# Patient Record
Sex: MALE | Race: WHITE | NOT HISPANIC OR LATINO | Employment: FULL TIME | URBAN - METROPOLITAN AREA
[De-identification: names, ages, dates, MRNs, and addresses within clinical notes are randomized per-mention and may not be internally consistent; named-entity substitution may affect disease eponyms.]

---

## 2017-06-07 ENCOUNTER — AMBULATORY - HEALTHEAST (OUTPATIENT)
Dept: FAMILY MEDICINE | Facility: CLINIC | Age: 52
End: 2017-06-07

## 2017-06-07 DIAGNOSIS — I51.89 FAMILIAL HEART DISEASE: ICD-10-CM

## 2017-06-07 DIAGNOSIS — Z12.5 PROSTATE CANCER SCREENING: ICD-10-CM

## 2017-06-07 DIAGNOSIS — L72.3 SEBACEOUS CYST: ICD-10-CM

## 2017-06-07 ASSESSMENT — MIFFLIN-ST. JEOR: SCORE: 1638.65

## 2017-06-09 ENCOUNTER — OFFICE VISIT - HEALTHEAST (OUTPATIENT)
Dept: SURGERY | Facility: CLINIC | Age: 52
End: 2017-06-09

## 2017-06-09 DIAGNOSIS — L72.3 SEBACEOUS CYST: ICD-10-CM

## 2017-06-16 ENCOUNTER — OFFICE VISIT - HEALTHEAST (OUTPATIENT)
Dept: SURGERY | Facility: CLINIC | Age: 52
End: 2017-06-16

## 2017-06-16 DIAGNOSIS — Z48.89 POSTOPERATIVE VISIT: ICD-10-CM

## 2019-03-13 ENCOUNTER — OFFICE VISIT - HEALTHEAST (OUTPATIENT)
Dept: FAMILY MEDICINE | Facility: CLINIC | Age: 54
End: 2019-03-13

## 2019-03-13 DIAGNOSIS — H69.91 DYSFUNCTION OF RIGHT EUSTACHIAN TUBE: ICD-10-CM

## 2019-03-13 DIAGNOSIS — J01.10 ACUTE NON-RECURRENT FRONTAL SINUSITIS: ICD-10-CM

## 2021-01-26 ENCOUNTER — OFFICE VISIT - HEALTHEAST (OUTPATIENT)
Dept: FAMILY MEDICINE | Facility: CLINIC | Age: 56
End: 2021-01-26

## 2021-01-26 ENCOUNTER — COMMUNICATION - HEALTHEAST (OUTPATIENT)
Dept: TELEHEALTH | Facility: CLINIC | Age: 56
End: 2021-01-26

## 2021-01-26 DIAGNOSIS — Z00.00 WELLNESS EXAMINATION: ICD-10-CM

## 2021-01-26 DIAGNOSIS — R97.20 ELEVATED PROSTATE SPECIFIC ANTIGEN (PSA): ICD-10-CM

## 2021-01-26 DIAGNOSIS — Z23 NEED FOR VACCINATION: ICD-10-CM

## 2021-01-26 LAB
ANION GAP SERPL CALCULATED.3IONS-SCNC: 8 MMOL/L (ref 5–18)
BUN SERPL-MCNC: 16 MG/DL (ref 8–22)
CALCIUM SERPL-MCNC: 9.3 MG/DL (ref 8.5–10.5)
CHLORIDE BLD-SCNC: 107 MMOL/L (ref 98–107)
CHOLEST SERPL-MCNC: 173 MG/DL
CO2 SERPL-SCNC: 27 MMOL/L (ref 22–31)
CREAT SERPL-MCNC: 1.17 MG/DL (ref 0.7–1.3)
ERYTHROCYTE [DISTWIDTH] IN BLOOD BY AUTOMATED COUNT: 12.1 % (ref 11–14.5)
FASTING STATUS PATIENT QL REPORTED: YES
GFR SERPL CREATININE-BSD FRML MDRD: >60 ML/MIN/1.73M2
GLUCOSE BLD-MCNC: 102 MG/DL (ref 70–125)
HCT VFR BLD AUTO: 53.3 % (ref 40–54)
HDLC SERPL-MCNC: 44 MG/DL
HGB BLD-MCNC: 17.9 G/DL (ref 14–18)
LDLC SERPL CALC-MCNC: 116 MG/DL
MCH RBC QN AUTO: 31.2 PG (ref 27–34)
MCHC RBC AUTO-ENTMCNC: 33.6 G/DL (ref 32–36)
MCV RBC AUTO: 93 FL (ref 80–100)
PLATELET # BLD AUTO: 136 THOU/UL (ref 140–440)
PMV BLD AUTO: 7.6 FL (ref 7–10)
POTASSIUM BLD-SCNC: 4.9 MMOL/L (ref 3.5–5)
PSA SERPL-MCNC: 7.9 NG/ML (ref 0–3.5)
RBC # BLD AUTO: 5.75 MILL/UL (ref 4.4–6.2)
SODIUM SERPL-SCNC: 142 MMOL/L (ref 136–145)
TRIGL SERPL-MCNC: 65 MG/DL
WBC: 4.4 THOU/UL (ref 4–11)

## 2021-01-26 ASSESSMENT — MIFFLIN-ST. JEOR: SCORE: 1688.55

## 2021-02-01 ENCOUNTER — COMMUNICATION - HEALTHEAST (OUTPATIENT)
Dept: FAMILY MEDICINE | Facility: CLINIC | Age: 56
End: 2021-02-01

## 2021-04-23 ENCOUNTER — AMBULATORY - HEALTHEAST (OUTPATIENT)
Dept: NURSING | Facility: CLINIC | Age: 56
End: 2021-04-23

## 2021-05-14 ENCOUNTER — AMBULATORY - HEALTHEAST (OUTPATIENT)
Dept: NURSING | Facility: CLINIC | Age: 56
End: 2021-05-14

## 2021-05-28 ASSESSMENT — ASTHMA QUESTIONNAIRES: ACT_TOTALSCORE: 21

## 2021-05-31 VITALS — WEIGHT: 178 LBS | HEIGHT: 70 IN | BODY MASS INDEX: 25.48 KG/M2

## 2021-05-31 VITALS — WEIGHT: 182 LBS | BODY MASS INDEX: 26.11 KG/M2

## 2021-06-02 ENCOUNTER — OFFICE VISIT - HEALTHEAST (OUTPATIENT)
Dept: INTERNAL MEDICINE | Facility: CLINIC | Age: 56
End: 2021-06-02

## 2021-06-02 VITALS — BODY MASS INDEX: 26.26 KG/M2 | WEIGHT: 183 LBS

## 2021-06-02 DIAGNOSIS — R53.82 CHRONIC FATIGUE: ICD-10-CM

## 2021-06-02 DIAGNOSIS — R53.81 MALAISE: ICD-10-CM

## 2021-06-02 DIAGNOSIS — J45.20 MILD INTERMITTENT ASTHMA, UNSPECIFIED WHETHER COMPLICATED: ICD-10-CM

## 2021-06-02 DIAGNOSIS — L57.8 SUN-DAMAGED SKIN: ICD-10-CM

## 2021-06-02 LAB
ALBUMIN SERPL-MCNC: 4.4 G/DL (ref 3.5–5)
ALP SERPL-CCNC: 93 U/L (ref 45–120)
ALT SERPL W P-5'-P-CCNC: 23 U/L (ref 0–45)
ANION GAP SERPL CALCULATED.3IONS-SCNC: 12 MMOL/L (ref 5–18)
AST SERPL W P-5'-P-CCNC: 20 U/L (ref 0–40)
BASOPHILS # BLD AUTO: 0 THOU/UL (ref 0–0.2)
BASOPHILS NFR BLD AUTO: 0 % (ref 0–2)
BILIRUB SERPL-MCNC: 0.7 MG/DL (ref 0–1)
BUN SERPL-MCNC: 22 MG/DL (ref 8–22)
C REACTIVE PROTEIN LHE: 0.1 MG/DL (ref 0–0.8)
CALCIUM SERPL-MCNC: 9.2 MG/DL (ref 8.5–10.5)
CHLORIDE BLD-SCNC: 104 MMOL/L (ref 98–107)
CO2 SERPL-SCNC: 24 MMOL/L (ref 22–31)
CREAT SERPL-MCNC: 1.22 MG/DL (ref 0.7–1.3)
EOSINOPHIL # BLD AUTO: 0.3 THOU/UL (ref 0–0.4)
EOSINOPHIL NFR BLD AUTO: 4 % (ref 0–6)
ERYTHROCYTE [DISTWIDTH] IN BLOOD BY AUTOMATED COUNT: 12.4 % (ref 11–14.5)
ERYTHROCYTE [SEDIMENTATION RATE] IN BLOOD BY WESTERGREN METHOD: 2 MM/HR (ref 0–15)
GFR SERPL CREATININE-BSD FRML MDRD: >60 ML/MIN/1.73M2
GLUCOSE BLD-MCNC: 108 MG/DL (ref 70–125)
HCT VFR BLD AUTO: 47.1 % (ref 40–54)
HGB BLD-MCNC: 16.4 G/DL (ref 14–18)
IMM GRANULOCYTES # BLD: 0 THOU/UL
IMM GRANULOCYTES NFR BLD: 0 %
LYMPHOCYTES # BLD AUTO: 1.2 THOU/UL (ref 0.8–4.4)
LYMPHOCYTES NFR BLD AUTO: 17 % (ref 20–40)
MCH RBC QN AUTO: 30.3 PG (ref 27–34)
MCHC RBC AUTO-ENTMCNC: 34.8 G/DL (ref 32–36)
MCV RBC AUTO: 87 FL (ref 80–100)
MONOCYTES # BLD AUTO: 0.5 THOU/UL (ref 0–0.9)
MONOCYTES NFR BLD AUTO: 7 % (ref 2–10)
NEUTROPHILS # BLD AUTO: 5 THOU/UL (ref 2–7.7)
NEUTROPHILS NFR BLD AUTO: 72 % (ref 50–70)
PLATELET # BLD AUTO: 171 THOU/UL (ref 140–440)
PMV BLD AUTO: 9.6 FL (ref 7–10)
POTASSIUM BLD-SCNC: 4.2 MMOL/L (ref 3.5–5)
PROT SERPL-MCNC: 7.1 G/DL (ref 6–8)
RBC # BLD AUTO: 5.42 MILL/UL (ref 4.4–6.2)
SODIUM SERPL-SCNC: 140 MMOL/L (ref 136–145)
TSH SERPL DL<=0.005 MIU/L-ACNC: 1.51 UIU/ML (ref 0.3–5)
VIT B12 SERPL-MCNC: 461 PG/ML (ref 213–816)
WBC: 7 THOU/UL (ref 4–11)

## 2021-06-02 RX ORDER — FEXOFENADINE HCL AND PSEUDOEPHEDRINE HCL 180; 240 MG/1; MG/1
1 TABLET, EXTENDED RELEASE ORAL DAILY
Status: SHIPPED | COMMUNITY
Start: 2021-06-02 | End: 2024-03-01

## 2021-06-02 RX ORDER — ALBUTEROL SULFATE 90 UG/1
2 AEROSOL, METERED RESPIRATORY (INHALATION) EVERY 6 HOURS PRN
Qty: 1 EACH | Refills: 5 | Status: SHIPPED | OUTPATIENT
Start: 2021-06-02 | End: 2023-03-21

## 2021-06-03 LAB — 25(OH)D3 SERPL-MCNC: 41.8 NG/ML (ref 30–80)

## 2021-06-05 VITALS
HEART RATE: 80 BPM | SYSTOLIC BLOOD PRESSURE: 120 MMHG | OXYGEN SATURATION: 99 % | DIASTOLIC BLOOD PRESSURE: 90 MMHG | BODY MASS INDEX: 27.06 KG/M2 | HEIGHT: 70 IN | WEIGHT: 189 LBS

## 2021-06-11 NOTE — PROGRESS NOTES
HPI: Pt is here for follow up of a seb cyst removal on scalp.   he is doing well.  Pain is well controlled.  No difficulties with the surgical wound/wounds.  he is eating well and denies fever and chills.         /84  Pulse 64  SpO2 96%    EXAM:  GENERAL:Appears well    SURGICAL WOUNDS:  Incisions healing well, no enduration or drainage.    .lastlab[CASEREPORT    Assessment/Plan: . Doing well after surgery and should follow up as needed.  Sonia Paul PA-C  [facility] Department of Surgery

## 2021-06-11 NOTE — PROGRESS NOTES
Excision  Date/Time: 6/16/2017 2:58 PM  Performed by: HITESH KRAUSE  Authorized by: HITESH KRAUSE     Consent:     Consent obtained:  Verbal    Consent given by:  Patient    Risks discussed:  Bleeding, infection, pain and poor cosmetic result    Alternatives discussed:  No treatment  Universal protocol:     Procedure explained and questions answered to patient or proxy's satisfaction: yes      Relevant documents present and verified: yes      Test results available and properly labeled: yes      Site/side marked: yes      Patient identity confirmed:  Verbally with patient  Indications:     Indications:  Cyst on forehead for many years enlarging  Pre-procedure details:     Skin preparation:  Betadine    Preparation: Patient was prepped and draped in the usual sterile fashion    Anesthesia (see MAR for exact dosages):     Anesthesia method:  Local infiltration    Local anesthetic:  Lidocaine 1% WITH epi  Post-procedure details:     Patient tolerance of procedure:  Tolerated well, no immediate complications  Comments:      Indications: Left scalp cyst  Enlarging cyst on his forehead, over the past few years becoming more symptomatic and enlarging    Procedure:  Consent obtained his forehead was prepped and draped in sterile manner.  1% lidocaine with epinephrine was infiltrated into the cyst itself and subcutaneous tissues in the field block of the area.  Incision was made over the cyst the cyst was shelled out in its entirety.  There are 2 cysts one large one about 2-1/2 cm in diameter and the other small and about 0.8 cm in diameter.  These were both removed from the cavity area.  We obtained hemostasis with electrocautery and the base deep layers are closed with 3-0 Vicryl suture in a subcuticular stitch.  The skin was closed with 4-0 Monocryl subcuticular stitch.  Steri-Strip and sterile dressing was applied.  No apparent complications tolerated without any issues.    Hitesh Krause MD  [facility] Surgery  Dept.\

## 2021-06-14 NOTE — PROGRESS NOTES
Assessment:      Healthy male exam.      Plan:    1. Wellness examination  -Encourage exercise, baby aspirin daily, and discuss stress management.  - Tdap vaccine greater than or equal to 6yo IM  - HM2(CBC w/o Differential)  - Basic Metabolic Panel  - Lipid Cascade  - PSA (Prostatic-Specific Antigen), Annual Screen    RTC 1 year     All questions answered.     Subjective:      Chidi Mathew is a 56 y.o. male who presents for an annual exam.  He works for an [employer] and has aches and pains consistent with his job.  He is considering slowing down which I think is a reasonable thing.  Otherwise he plays hockey several times a week, is active and has no complaints.    Healthy Habits:   Regular Exercise: No  Sunscreen Use: No  Healthy Diet: Yes  Dental Visits Regularly: No  Seat Belt: Yes  Sexually active: Yes  Monthly Self Testicular Exams:  Yes  Hemoccults: No  Flex Sig: No  Colonoscopy: Yes  Lipid Profile: Yes  Glucose Screen: Yes  Prevention of Osteoporosis: No  Last Dexa: No  Guns at Home:  No      Immunization History   Administered Date(s) Administered     DT (pediatric) 09/07/2000, 04/16/2003     Td,adult,historic,unspecified 04/16/2003     Immunization status: up to date and documented.    No exam data present     Current Outpatient Medications   Medication Sig Dispense Refill     cetirizine (ZYRTEC) 10 MG tablet Take 1 tablet (10 mg total) by mouth daily. 30 tablet 0     fluticasone (FLONASE ALLERGY RELIEF) 50 mcg/actuation nasal spray 1 spray into each nostril daily. 16 g 0     No current facility-administered medications for this visit.      No past medical history on file.  No past surgical history on file.  Patient has no known allergies.  Family History   Problem Relation Age of Onset     Heart disease Father      Social History     Socioeconomic History     Marital status:      Spouse name: Not on file     Number of children: Not on file     Years of education: Not on file     Highest  education level: Not on file   Occupational History     Not on file   Social Needs     Financial resource strain: Not on file     Food insecurity     Worry: Not on file     Inability: Not on file     Transportation needs     Medical: Not on file     Non-medical: Not on file   Tobacco Use     Smoking status: Never Smoker     Smokeless tobacco: Never Used   Substance and Sexual Activity     Alcohol use: Yes     Comment: rare occasions     Drug use: No     Sexual activity: Yes     Partners: Female   Lifestyle     Physical activity     Days per week: Not on file     Minutes per session: Not on file     Stress: Not on file   Relationships     Social connections     Talks on phone: Not on file     Gets together: Not on file     Attends Latter-day service: Not on file     Active member of club or organization: Not on file     Attends meetings of clubs or organizations: Not on file     Relationship status: Not on file     Intimate partner violence     Fear of current or ex partner: Not on file     Emotionally abused: Not on file     Physically abused: Not on file     Forced sexual activity: Not on file   Other Topics Concern     Not on file   Social History Narrative     Not on file       Review of Systems  Review of Systems          Objective:     There were no vitals filed for this visit.  There is no height or weight on file to calculate BMI.    Physical  Physical Exam       Constitutional:    --Vitals as above  --No acute distress  Eyes-  --Sclera noninjected  --Lids and conjunctiva normal  ENT-  --TMs clear  --Sclera noninjected  --Pharynx not erythematous  Neck-  --Neck supple with no cervical lymphadenopathy  Lungs-  --Clear to Auscultation  Heart-  --Regular rate and rhythm  Abdomen--  --Soft, non-tender, non-distended  Skin-  --Pink and dry  Psych-  --Alert and oriented  --Normal affect      ADDENDUM: PSA came back elevated.  Discussed with pt and emphasized urgency in seeing urology.  Urgent referral to urology  placed.

## 2021-06-16 PROBLEM — L57.8 SUN-DAMAGED SKIN: Status: ACTIVE | Noted: 2021-06-02

## 2021-06-17 NOTE — PATIENT INSTRUCTIONS - HE
Patient Instructions by Albert Louie PA-C at 3/13/2019  7:30 AM     Author: Albert Louie PA-C Service: -- Author Type: Physician Assistant    Filed: 3/13/2019  7:58 AM Encounter Date: 3/13/2019 Status: Signed    : Albert Louie PA-C (Physician Assistant)       Over-the-counter nasal steroid spray, follow packaging directions  Over-the-counter Mucinex, follow packaging directions  Hot packs 3 times per day to the forehead and face over the tender sinuses  Distal saline salt water or saline irrigation with Weston Haley  Antibiotic as written below.  Risks and benefits of the medication were gone over.  Indication for return to see urgent care or family practice provider for reevaluation and treatment.    As a result of our visit today, here are the action plans for you:    1. Medication(s) to stop: There are no discontinued medications.    2. Medication(s) to start or change:   Medications Ordered   Medications   ? cetirizine (ZYRTEC) 10 MG tablet     Sig: Take 1 tablet (10 mg total) by mouth daily.     Dispense:  30 tablet     Refill:  0   ? doxycycline (MONODOX) 100 MG capsule     Sig: Take 1 capsule (100 mg total) by mouth 2 (two) times a day for 10 days.     Dispense:  20 capsule     Refill:  0   ? fluticasone (FLONASE ALLERGY RELIEF) 50 mcg/actuation nasal spray     Si spray into each nostril daily.     Dispense:  16 g     Refill:  0       3. Other instructions: Yes    Use of over-the-counter Zyrtec.  Follow packaging directions  Use of over-the-counter Flonase  Frequent swallowing drinking and chewing gum to help create low-grade suction on the eustachian tube.  Elevate head at nighttime so it does not increase pressure  Use of over-the-counter Tylenol as needed for comfort.  Return to primary care provider for reevaluation treatment if any complication or new symptoms should present.        Patient Education     Earache, No Infection (Adult)  Earaches can happen without an infection. This occurs  when air and fluid build up behind the eardrum causing a feeling of fullness and discomfort and reduced hearing. This is called otitis media with effusion (OME) or serous otitis media. It means there is fluid in the middle ear. It is not the same as acute otitis media, which is typically from infection.  OME can happen when you have a cold if congestion blocks the passage that drains the middle ear. This passage is called the eustachian tube. OME may also occur with nasal allergies or after a bacterial middle ear infection.    The pain or discomfort may come and go. You may hear clicking or popping sounds when you chew or swallow. You may feel that your balance is off. Or you may hear ringing in the ear.  It often takes from several weeks up to 3 months for the fluid to clear on its own. Oral pain relievers and ear drops help if there is pain. Decongestants and antihistamines sometimes help. Antibiotics don't help since there is no infection. Your doctor may prescribe a nasal spray to help reduce swelling in the nose and eustachian tube. This can allow the ear to drain.  If your OME doesn't improve after 3 months, surgery may be used to drain the fluid and insert a small tube in the eardrum to allow continued drainage.  Because the middle ear fluid can become infected, it is important to watch for signs of an ear infection which may develop later. These signs include increased ear pain, fever, or drainage from the ear.  Home care  The following guidelines will help you care for yourself at home:    You may use over-the-counter medicine as directed to control pain, unless another medicine was prescribed. If you have chronic liver or kidney disease or ever had a stomach ulcer or GI bleeding, talk with your doctor before using these medicines. Aspirin should never be used in anyone under 18 years of age who is ill with a fever. It may cause severe liver damage.    You may use over-the-counter decongestants such as  phenylephrine or pseudoephedrine. But they are not always helpful. Don't use nasal spray decongestants more than 3 days. Longer use can make congestion worse. Prescription nasal sprays from your doctor don't typically have those restrictions.    Antihistamines may help if you are also having allergy symptoms.    You may use medicines such as guaifenesin to thin mucus and promote drainage.  Follow-up care  Follow up with your healthcare provider or as advised if you are not feeling better after 3 days.  When to seek medical advice  Call your healthcare provider right away if any of the following occur:    Your ear pain gets worse or does not start to improve     Fever of 100.4 F (38 C) or higher, or as directed by your healthcare provider    Fluid or blood draining from the ear    Headache or sinus pain    Stiff neck    Unusual drowsiness or confusion  Date Last Reviewed: 10/1/2016    2487-7916 [employer]. [address] [place] [address]. All rights reserved. This information is not intended as a substitute for professional medical care. Always follow your healthcare professional's instructions.             Acute Bacterial Rhinosinusitis (ABRS)  Acute bacterial rhinosinusitis (ABRS) is an infection of your nasal cavity and sinuses. Its caused by bacteria. Acute means that youve had symptoms for less than 12 weeks.  Understanding your sinuses  The nasal cavity is the large air-filled space behind your nose. The sinuses are a group of spaces formed by the bones of your face. They connect with your nasal cavity. ABRS causes the tissue lining these spaces to become inflamed. Mucus may not drain normally. This leads to facial pain and other symptoms.  What causes ABRS?  ABRS most often follows an upper respiratory infection caused by a virus. Bacteria then infect the lining of your nasal cavity and sinuses. But you can also get ABRS if you have:    Nasal allergies    Long-term nasal swelling and  congestion not caused by allergies    Blockage in the nose  Symptoms of ABRS  The symptoms of ABRS may be different for each person, and can include:    Nasal congestion    Runny nose    Fluid draining from the nose down the throat (postnasal drip)    Headache    Cough    Pain in the sinuses    Thick, colored fluid from the nose (mucus)    Fever  Diagnosing ABRS  ABRS may be diagnosed if youve had an upper respiratory infection like a cold and cough for longer than 10 to 14 days. Your health care provider will ask about your symptoms and your medical history. The provider will check your vital signs, including your temperature. Youll have a physical exam. The health care provider will check your ears, nose, and throat. You likely wont need any tests. If ABRS comes back, you may have a culture or other tests.  Treatment for ABRS  Treatment may include:    Antibiotic medicine. This is for symptoms that last for at least 10 to 14 days.    Nasal corticosteroid medicine. Drops or spray used in the nose can lessen swelling and congestion.    Over-the-counter pain medicine. This is to lessen sinus pain and pressure.    Nasal decongestant medicine. Spray or drops may help to lessen congestion. Do not use them for more than a few days.    Salt wash (saline irrigation). This can help to loosen mucus.  Possible complications of ABRS  ABRS may come back or become long-term (chronic).  In rare cases, ABRS may cause complications such as:     Inflamed tissue around the brain and spinal cord (meningitis)    Inflamed tissue around the eyes (orbital cellulitis)    Inflamed bones around the sinuses (osteitis)  These problems may need to be treated in a hospital with intravenous (IV) antibiotic medicine or surgery.  When to call the health care provider  Call your health care provider if you have any of the following:    Symptoms that dont get better, or get worse    Symptoms that dont get better after 3 to 5 days on  antibiotics    Trouble seeing    Swelling around your eyes    Confusion or trouble staying awake   Date Last Reviewed: 3/3/2015    7617-6391 [employer]. [address] [place] [address], [place] [address]. All rights reserved. This information is not intended as a substitute for professional medical care. Always follow your healthcare professional's instructions.

## 2021-06-18 NOTE — PROGRESS NOTES
"Chidi Mathew is a 52-year-old resents today for follow-up of his left scalp mass.  Please see previous notes.  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  We reviewed our previous evaluation in 2014 and clearly he did not follow through with excision at that time.  Thankfully he has had no problems leaking or drainage.    We reviewed heart disease prevention and cancer detection.  I encouraged him to follow through with fasting labs as well as PSA test.  His father has had heart problems since his last visit.    Objective:/62  Pulse 77  Resp 16  Ht 5' 10\" (1.778 m)  Wt 178 lb (80.7 kg)  SpO2 96%  BMI 25.54 kg/m2  Skin on the left parietal area he has a about a 3 cm cutaneous fullness that feels like a sebaceous cyst this is noninflamed nontender non-pointing non-erythematous it feels like it is somewhat scarred down around the margins particularly posteriorly and thus I think it has the potential to be challenging here in the clinic.    Assessment: Left scalp sebaceous cyst, family history of heart disease    Plan: Recommend he follow through with fasting labs(he is not fasting here today) follow through with general surgery evaluation and removal of the sebaceous cyst risks benefits side effects reviewed and he will follow-up here otherwise as needed  " 121.6

## 2021-06-25 NOTE — PROGRESS NOTES
Office Visit - New Patient   Chidi PRETTY    56 y.o.  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  The issue that we are investigating today is    Malaise, fatigue, and aches and pains in various parts of his body, occurring over the last approximately 1 year (since early 2020).    He is physically very active.  His job doing autobody requires him to be on the move all day long.  He also plays hockey.  He has noticed that his balance and equilibrium seem a little bit off when he is in the heat of a hockey match.    He told me that at his job he is not exposed to excessive fumes or dust.  He told her that alcohol consumption is modest, maybe a beer a week.  Never had smoking habit.    His past medical history is pretty unremarkable (elevated PSA discussed below), so I am going to cast a pretty broad net here, looking for metabolic, or inflammatory phenomena.  I am going to check a comprehensive metabolic panel, blood cell count with differential, sedimentation rate, C-reactive protein, vitamin D and B12 levels, and thyroid cascade.    If all those come back normal, I would be able to assure him that his core organ systems are performing okay.  I do not find anything focal on physical exam.    I questioned him about obstructive sleep apnea, and he is pretty sure he does not have that.  His wife would have told him.  He told me that he might snore if he sleeps on his back.    Seasonal nasal allergies, for which he uses over-the-counter antihistamine and decongestant.  He has been using Allegra-D, and I told he could consider using the nasal steroid fluticasone (known as the brand name Flonase), 2 sprays per nostril per day during allergy season.    Mild asthma,  worse when he was a kid, I will issue him an albuterol inhaler to have on hand just in case    Small right-sided inguinal hernia, not bothersome    Imbalance, possibly mild vertigo symptoms, I wonder if this has to do with eustachian tube dysfunction from his allergies, which could affect middle and inner ear function.    Elevated PSA from January 2021, thoroughly evaluated at [facility], attributed to BPH with neurologist telling him he has an enlarged prostate gland.  On a general checkup in January 2021, an unexpected finding of a PSA of 7.9 was disclosed.  He then underwent a prostate ultrasound and MRI followed ultimately by biopsy, with no cancerous tissue found.  He will be following up with [facility].  He told me that he might experience some mild BPH symptoms of reduced flow, but otherwise bladder empties out pretty well.    Sun damaged skin and a more darkly pigmented mole on his left lower back.  I would recommend that he see dermatology.  He will self refer.    He received second dose of Pfizer COVID-19 vaccine May 14, 2021.    ---------------------------------------------------------------------------------------------------------------------------  Chief Complaint   Chief Complaint   Patient presents with     Dizziness     feels off sometimes, SOB comes and goes.        ---------------------------------------------------------------------------------------------------------------------------  History of Present Illness  This 56 y.o. 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  The issue that we are investigating today is    Malaise, fatigue, and aches and pains in various parts of his body, occurring over the last approximately 1 year (since early 2020).    He is physically very active.  His job doing autobody requires  him to be on the move all day long.  He also plays hockey.  He has noticed that his balance and equilibrium seem a little bit off when he is in the heat of a hockey match.    He told me that at his job he is not exposed to excessive fumes or dust.  He told her that alcohol consumption is modest, maybe a beer a week.  Never had smoking habit.    His past medical history is pretty unremarkable (elevated PSA discussed below), so I am going to cast a pretty broad net here, looking for metabolic, or inflammatory phenomena.  I am going to check a comprehensive metabolic panel, blood cell count with differential, sedimentation rate, C-reactive protein, vitamin D and B12 levels, and thyroid cascade.    If all those come back normal, I would be able to assure him that his core organ systems are performing okay.  I do not find anything focal on physical exam.    I questioned him about obstructive sleep apnea, and he is pretty sure he does not have that.  His wife would have told him.  He told me that he might snore if he sleeps on his back.      BPH  Lab Results   Component Value Date    PSA 7.9 (H) 01/26/2021    PSA 1.0 08/26/2014       Wt Readings from Last 3 Encounters:   06/02/21 186 lb (84.4 kg)   01/26/21 189 lb (85.7 kg)   03/13/19 183 lb (83 kg)     BP Readings from Last 3 Encounters:   06/02/21 112/80   01/26/21 120/90   03/13/19 146/82       Lab Results   Component Value Date    WBC 4.4 01/26/2021    HGB 17.9 01/26/2021    HCT 53.3 01/26/2021     (L) 01/26/2021    CHOL 173 01/26/2021    TRIG 65 01/26/2021    HDL 44 01/26/2021    LDLDIRECT 133 (H) 08/26/2014     01/26/2021    K 4.9 01/26/2021     01/26/2021    CREATININE 1.17 01/26/2021    BUN 16 01/26/2021    CO2 27 01/26/2021    PSA 7.9 (H) 01/26/2021       Immunization History   Administered Date(s) Administered     COVID-19,PF,Pfizer 04/23/2021, 05/14/2021     DT (pediatric) 09/07/2000, 04/16/2003     Td,adult,historic,unspecified 04/16/2003      Tdap 01/26/2021       Review of Systems: A comprehensive review of systems was negative except as noted.  ---------------------------------------------------------------------------------------------------------------------------    Medications and Allergies   Current Outpatient Medications   Medication Sig Dispense Refill     fexofenadine-pseudoephedrine (ALLEGRA-D 24) 180-240 mg per 24 hr tablet Take 1 tablet by mouth daily.       No current facility-administered medications for this visit.      Allergies   Allergen Reactions     Pollen Extracts         Active Ambulatory Problems     Diagnosis Date Noted     Allergic Rhinitis      Asthma      Sebaceous Cyst      Inguinal Hernia      Resolved Ambulatory Problems     Diagnosis Date Noted     No Resolved Ambulatory Problems     No Additional Past Medical History     No past surgical history on file.   No past medical history on file.     Family and Social History   Family History   Problem Relation Age of Onset     Heart disease Father         Social History     Tobacco Use     Smoking status: Never Smoker     Smokeless tobacco: Never Used   Substance Use Topics     Alcohol use: Yes     Comment: rare occasions     Drug use: No        Physical Exam   General Appearance:   He appears well, athletic build, excellent blood pressure, reasonable BMI given his muscle mass    /80   Pulse 87   Wt 186 lb (84.4 kg)   SpO2 97%   BMI 26.69 kg/m      General: Alert, in no distress  Skin: + Sun damaged skin and some moles, with a darker 2 mm 1 left lower back  Eyes/nose/throat: Eyes without scleral icterus, eye movements normal, pupils equal and reactive, oropharynx clear, ears with normal TM's  MSK: Neck with good ROM  Lymphatic: Neck without adenopathy or masses  Endocrine: Thyroid with no nodules to palpation  Pulm: Lungs clear to auscultation bilaterally  Cardiac: Heart with regular rate and rhythm, no murmur or gallop  GI: Abdomen soft, nontender. No palpable  enlargement of liver or spleen  MSK: Extremities no tenderness or edema  Neuro: Moves all extremities, without focal weakness  Psych: Alert, normal mental status. Normal affect and speech         Additional Information        Vic Bazan MD  Internal Medicine

## 2021-06-26 NOTE — PATIENT INSTRUCTIONS - HE
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  The issue that we are investigating today is    Malaise, fatigue, and aches and pains in various parts of his body, occurring over the last approximately 1 year (since early 2020).    He is physically very active.  His job doing autobody requires him to be on the move all day long.  He also plays hockey.  He has noticed that his balance and equilibrium seem a little bit off when he is in the heat of a hockey match.    He told me that at his job he is not exposed to excessive fumes or dust.  He told her that alcohol consumption is modest, maybe a beer a week.  Never had smoking habit.    His past medical history is pretty unremarkable (elevated PSA discussed below), so I am going to cast a pretty broad net here, looking for metabolic, or inflammatory phenomena.  I am going to check a comprehensive metabolic panel, blood cell count with differential, sedimentation rate, C-reactive protein, vitamin D and B12 levels, and thyroid cascade.    If all those come back normal, I would be able to assure him that his core organ systems are performing okay.  I do not find anything focal on physical exam.    I questioned him about obstructive sleep apnea, and he is pretty sure he does not have that.  His wife would have told him.  He told me that he might snore if he sleeps on his back.    Seasonal nasal allergies, for which he uses over-the-counter antihistamine and decongestant.  He has been using Allegra-D, and I told he could consider using the nasal steroid fluticasone (known as the brand name Flonase), 2 sprays per nostril per day during allergy season.    Mild asthma, worse when he was a kid, I will issue him an albuterol inhaler to have on hand just in case    Small right-sided inguinal hernia, not  bothersome    Imbalance, possibly mild vertigo symptoms, I wonder if this has to do with eustachian tube dysfunction from his allergies, which could affect middle and inner ear function.    Elevated PSA from January 2021, thoroughly evaluated at [facility], attributed to BPH with neurologist telling him he has an enlarged prostate gland.  On a general checkup in January 2021, an unexpected finding of a PSA of 7.9 was disclosed.  He then underwent a prostate ultrasound and MRI followed ultimately by biopsy, with no cancerous tissue found.  He will be following up with [facility].  He told me that he might experience some mild BPH symptoms of reduced flow, but otherwise bladder empties out pretty well.    Sun damaged skin and a more darkly pigmented mole on his left lower back.  I would recommend that he see dermatology.  He will self refer.    He received second dose of Pfizer COVID-19 vaccine May 14, 2021.

## 2021-06-27 NOTE — PROGRESS NOTES
Progress Notes by Albert Louie PA-C at 3/13/2019  7:30 AM     Author: Albert Louie PA-C Service: -- Author Type: Physician Assistant    Filed: 3/13/2019  3:15 PM Encounter Date: 3/13/2019 Status: Signed    : Albert Louie PA-C (Physician Assistant)       Subjective:      Patient ID: Chidi Mathew is a 54 y.o. male.    Chief Complaint:    HPI  Chidi Mathew is a 54 y.o. male who presents today complaining of a two week cold like symptoms and a two day history of acute onset facial frontal and maxillary pain and pressure that is radiating to the teeth and to the jaw.  Patient has a headache that is made worse with dependency.  Postnasal drainage.  Denies fever chills night sweats epistaxis or other constitutional symptoms.  Has not tried any treatment for this at home.    No past medical history on file.    No past surgical history on file.    Family History   Problem Relation Age of Onset   ? Heart disease Father        Social History     Tobacco Use   ? Smoking status: Never Smoker   ? Smokeless tobacco: Never Used   Substance Use Topics   ? Alcohol use: Yes     Comment: occasional   ? Drug use: No       Review of Systems  As above in HPI, otherwise balance of Review of Systems are negative.    Objective:     /82   Pulse 69   Temp 98.6  F (37  C) (Oral)   Resp 10   Wt 183 lb (83 kg)   SpO2 96%   BMI 26.26 kg/m      Physical Exam  General: Patient is resting comfortably no acute distress is afebrile  HEENT: Head is normocephalic atraumatic   Frontal and maxillary sinuses are tender to percussion  eyes are PERRL   EOMI sclera anicteric   TMs on the right is congested with dull cone of light reflex  TM on the left is clear.  Throat is with mild posterior pharyngeal wall exudate but no erythema  No cervical lymphadenopathy present  Lungs: Clear to auscultation bilaterally  Heart: Regular rate and rhythm  Skin: Without rash non-diaphoretic      Assessment:     Procedures    The primary encounter  diagnosis was Acute non-recurrent frontal sinusitis. A diagnosis of Dysfunction of right eustachian tube was also pertinent to this visit.    Plan:     1. Acute non-recurrent frontal sinusitis  doxycycline (MONODOX) 100 MG capsule   2. Dysfunction of right eustachian tube  fluticasone (FLONASE ALLERGY RELIEF) 50 mcg/actuation nasal spray    cetirizine (ZYRTEC) 10 MG tablet         Patient Instructions     Over-the-counter nasal steroid spray, follow packaging directions  Over-the-counter Mucinex, follow packaging directions  Hot packs 3 times per day to the forehead and face over the tender sinuses  Distal saline salt water or saline irrigation with Di Haley  Antibiotic as written below.  Risks and benefits of the medication were gone over.  Indication for return to see urgent care or family practice provider for reevaluation and treatment.    As a result of our visit today, here are the action plans for you:    1. Medication(s) to stop: There are no discontinued medications.    2. Medication(s) to start or change:   Medications Ordered   Medications   ? cetirizine (ZYRTEC) 10 MG tablet     Sig: Take 1 tablet (10 mg total) by mouth daily.     Dispense:  30 tablet     Refill:  0   ? doxycycline (MONODOX) 100 MG capsule     Sig: Take 1 capsule (100 mg total) by mouth 2 (two) times a day for 10 days.     Dispense:  20 capsule     Refill:  0   ? fluticasone (FLONASE ALLERGY RELIEF) 50 mcg/actuation nasal spray     Si spray into each nostril daily.     Dispense:  16 g     Refill:  0       3. Other instructions: Yes    Use of over-the-counter Zyrtec.  Follow packaging directions  Use of over-the-counter Flonase  Frequent swallowing drinking and chewing gum to help create low-grade suction on the eustachian tube.  Elevate head at nighttime so it does not increase pressure  Use of over-the-counter Tylenol as needed for comfort.  Return to primary care provider for reevaluation treatment if any complication or new  symptoms should present.        Patient Education     Earache, No Infection (Adult)  Earaches can happen without an infection. This occurs when air and fluid build up behind the eardrum causing a feeling of fullness and discomfort and reduced hearing. This is called otitis media with effusion (OME) or serous otitis media. It means there is fluid in the middle ear. It is not the same as acute otitis media, which is typically from infection.  OME can happen when you have a cold if congestion blocks the passage that drains the middle ear. This passage is called the eustachian tube. OME may also occur with nasal allergies or after a bacterial middle ear infection.    The pain or discomfort may come and go. You may hear clicking or popping sounds when you chew or swallow. You may feel that your balance is off. Or you may hear ringing in the ear.  It often takes from several weeks up to 3 months for the fluid to clear on its own. Oral pain relievers and ear drops help if there is pain. Decongestants and antihistamines sometimes help. Antibiotics don't help since there is no infection. Your doctor may prescribe a nasal spray to help reduce swelling in the nose and eustachian tube. This can allow the ear to drain.  If your OME doesn't improve after 3 months, surgery may be used to drain the fluid and insert a small tube in the eardrum to allow continued drainage.  Because the middle ear fluid can become infected, it is important to watch for signs of an ear infection which may develop later. These signs include increased ear pain, fever, or drainage from the ear.  Home care  The following guidelines will help you care for yourself at home:    You may use over-the-counter medicine as directed to control pain, unless another medicine was prescribed. If you have chronic liver or kidney disease or ever had a stomach ulcer or GI bleeding, talk with your doctor before using these medicines. Aspirin should never be used in anyone  under 18 years of age who is ill with a fever. It may cause severe liver damage.    You may use over-the-counter decongestants such as phenylephrine or pseudoephedrine. But they are not always helpful. Don't use nasal spray decongestants more than 3 days. Longer use can make congestion worse. Prescription nasal sprays from your doctor don't typically have those restrictions.    Antihistamines may help if you are also having allergy symptoms.    You may use medicines such as guaifenesin to thin mucus and promote drainage.  Follow-up care  Follow up with your healthcare provider or as advised if you are not feeling better after 3 days.  When to seek medical advice  Call your healthcare provider right away if any of the following occur:    Your ear pain gets worse or does not start to improve     Fever of 100.4 F (38 C) or higher, or as directed by your healthcare provider    Fluid or blood draining from the ear    Headache or sinus pain    Stiff neck    Unusual drowsiness or confusion  Date Last Reviewed: 10/1/2016    1826-2195 The "[employer]. [address] [place] [address]. All rights reserved. This information is not intended as a substitute for professional medical care. Always follow your healthcare professional's instructions.             Acute Bacterial Rhinosinusitis (ABRS)  Acute bacterial rhinosinusitis (ABRS) is an infection of your nasal cavity and sinuses. Its caused by bacteria. Acute means that youve had symptoms for less than 12 weeks.  Understanding your sinuses  The nasal cavity is the large air-filled space behind your nose. The sinuses are a group of spaces formed by the bones of your face. They connect with your nasal cavity. ABRS causes the tissue lining these spaces to become inflamed. Mucus may not drain normally. This leads to facial pain and other symptoms.  What causes ABRS?  ABRS most often follows an upper respiratory infection caused by a virus. Bacteria then infect  the lining of your nasal cavity and sinuses. But you can also get ABRS if you have:    Nasal allergies    Long-term nasal swelling and congestion not caused by allergies    Blockage in the nose  Symptoms of ABRS  The symptoms of ABRS may be different for each person, and can include:    Nasal congestion    Runny nose    Fluid draining from the nose down the throat (postnasal drip)    Headache    Cough    Pain in the sinuses    Thick, colored fluid from the nose (mucus)    Fever  Diagnosing ABRS  ABRS may be diagnosed if youve had an upper respiratory infection like a cold and cough for longer than 10 to 14 days. Your health care provider will ask about your symptoms and your medical history. The provider will check your vital signs, including your temperature. Youll have a physical exam. The health care provider will check your ears, nose, and throat. You likely wont need any tests. If ABRS comes back, you may have a culture or other tests.  Treatment for ABRS  Treatment may include:    Antibiotic medicine. This is for symptoms that last for at least 10 to 14 days.    Nasal corticosteroid medicine. Drops or spray used in the nose can lessen swelling and congestion.    Over-the-counter pain medicine. This is to lessen sinus pain and pressure.    Nasal decongestant medicine. Spray or drops may help to lessen congestion. Do not use them for more than a few days.    Salt wash (saline irrigation). This can help to loosen mucus.  Possible complications of ABRS  ABRS may come back or become long-term (chronic).  In rare cases, ABRS may cause complications such as:     Inflamed tissue around the brain and spinal cord (meningitis)    Inflamed tissue around the eyes (orbital cellulitis)    Inflamed bones around the sinuses (osteitis)  These problems may need to be treated in a hospital with intravenous (IV) antibiotic medicine or surgery.  When to call the health care provider  Call your health care provider if you have any  of the following:    Symptoms that dont get better, or get worse    Symptoms that dont get better after 3 to 5 days on antibiotics    Trouble seeing    Swelling around your eyes    Confusion or trouble staying awake   Date Last Reviewed: 3/3/2015    9821-1249 [employer]. [address] [place] [address], [place] [address]. All rights reserved. This information is not intended as a substitute for professional medical care. Always follow your healthcare professional's instructions.

## 2021-07-06 VITALS
HEART RATE: 87 BPM | SYSTOLIC BLOOD PRESSURE: 112 MMHG | OXYGEN SATURATION: 97 % | WEIGHT: 186 LBS | DIASTOLIC BLOOD PRESSURE: 80 MMHG | BODY MASS INDEX: 26.69 KG/M2

## 2021-09-19 ENCOUNTER — HEALTH MAINTENANCE LETTER (OUTPATIENT)
Age: 56
End: 2021-09-19

## 2022-03-06 ENCOUNTER — HEALTH MAINTENANCE LETTER (OUTPATIENT)
Age: 57
End: 2022-03-06

## 2022-07-27 ENCOUNTER — TRANSFERRED RECORDS (OUTPATIENT)
Dept: HEALTH INFORMATION MANAGEMENT | Facility: CLINIC | Age: 57
End: 2022-07-27

## 2022-11-18 ENCOUNTER — HOSPITAL ENCOUNTER (OUTPATIENT)
Dept: CT IMAGING | Facility: CLINIC | Age: 57
Discharge: HOME | End: 2022-11-18
Attending: PHYSICIAN ASSISTANT | Admitting: PHYSICIAN ASSISTANT
Payer: COMMERCIAL

## 2022-11-18 ENCOUNTER — OFFICE VISIT (OUTPATIENT)
Dept: FAMILY MEDICINE | Facility: CLINIC | Age: 57
End: 2022-11-18
Payer: COMMERCIAL

## 2022-11-18 VITALS
RESPIRATION RATE: 17 BRPM | HEART RATE: 71 BPM | OXYGEN SATURATION: 97 % | TEMPERATURE: 97.8 F | SYSTOLIC BLOOD PRESSURE: 154 MMHG | BODY MASS INDEX: 27.26 KG/M2 | DIASTOLIC BLOOD PRESSURE: 91 MMHG | WEIGHT: 190 LBS

## 2022-11-18 DIAGNOSIS — N20.0 KIDNEY STONE: ICD-10-CM

## 2022-11-18 DIAGNOSIS — R39.89 URINARY PROBLEM: Primary | ICD-10-CM

## 2022-11-18 LAB
ALBUMIN UR-MCNC: NEGATIVE MG/DL
APPEARANCE UR: CLEAR
BACTERIA #/AREA URNS HPF: ABNORMAL /HPF
BILIRUB UR QL STRIP: NEGATIVE
COLOR UR AUTO: YELLOW
GLUCOSE UR STRIP-MCNC: NEGATIVE MG/DL
HGB UR QL STRIP: ABNORMAL
KETONES UR STRIP-MCNC: ABNORMAL MG/DL
LEUKOCYTE ESTERASE UR QL STRIP: ABNORMAL
NITRATE UR QL: NEGATIVE
PH UR STRIP: 6 [PH] (ref 5–8)
RBC #/AREA URNS AUTO: ABNORMAL /HPF
SP GR UR STRIP: 1.02 (ref 1–1.03)
SQUAMOUS #/AREA URNS AUTO: ABNORMAL /LPF
UROBILINOGEN UR STRIP-ACNC: 1 E.U./DL
WBC #/AREA URNS AUTO: ABNORMAL /HPF

## 2022-11-18 PROCEDURE — 74176 CT ABD & PELVIS W/O CONTRAST: CPT

## 2022-11-18 PROCEDURE — 99214 OFFICE O/P EST MOD 30 MIN: CPT | Performed by: PHYSICIAN ASSISTANT

## 2022-11-18 PROCEDURE — 81001 URINALYSIS AUTO W/SCOPE: CPT | Performed by: PHYSICIAN ASSISTANT

## 2022-11-18 RX ORDER — ASPIRIN 81 MG/1
81 TABLET, CHEWABLE ORAL DAILY
COMMUNITY

## 2022-11-18 RX ORDER — TAMSULOSIN HYDROCHLORIDE 0.4 MG/1
0.4 CAPSULE ORAL DAILY
Qty: 3 CAPSULE | Refills: 0 | Status: SHIPPED | OUTPATIENT
Start: 2022-11-18 | End: 2022-11-21

## 2022-11-18 NOTE — LETTER
[facility]  [address] [address]  Phone: [phone]  Fax: [phone]    November 18, 2022        Chidi Mathew  [address] [place] [address]          To whom it may concern:    RE: Chidi Odenr    Patient is seen today for a kidney stone.  He is excuse from work from 11/18/2022 through 11/21/2022.  He may return sooner as tolerated.    Please contact me for questions or concerns.      Sincerely,        Pham Mena PA-C

## 2022-11-18 NOTE — PROGRESS NOTES
Assessment & Plan:      Problem List Items Addressed This Visit    None  Visit Diagnoses     Urinary problem    -  Primary    Relevant Medications    tamsulosin (FLOMAX) 0.4 MG capsule    Other Relevant Orders    UA macro with reflex to Microscopic and Culture - Clinc Collect (Completed)    Urine Microscopic (Completed)    CT Abdomen Pelvis w/o Contrast (Completed)    Kidney stone            Medical Decision Making  Patient presents with sudden right flank pains over the last 24 hours.  Abdominal CT is positive for 3 mm stone in the distal ureter.  Recommend patient continue to drink plenty of clear fluids.  Also sent Flomax.  Use over-the-counter analgesics for pain relief.  Provided patient with a filter to catch the stone.  Recommend to follow-up with PCP in 3 days for symptom recheck.  Allergies and medication interactions reviewed.  Discussed signs of worsening symptoms and when to follow-up with PCP if no symptom improvement.     Subjective:      Chidi Mathew is a 57 year old male here for evaluation of right flank pains.  Onset of symptoms was yesterday.  Pains began suddenly in the morning and will come and go.  Pains will range from 8 out of 10 in severity at its worst.  Associated symptoms include nausea and subjective fevers.  Patient denies emesis and significant abdominal pains.  No history of kidney stones or UTI.  Patient does have history of prostatitis with occasional microscopic hematuria.  No history of abdominal procedures.     The following portions of the patient's history were reviewed and updated as appropriate: allergies, current medications, and problem list.     Review of Systems  Pertinent items are noted in HPI.    Allergies  Allergies   Allergen Reactions     Pollen Extracts [Pollen Extract] Unknown       Family History   Problem Relation Age of Onset     Heart Disease Father        Social History     Tobacco Use     Smoking status: Never     Smokeless tobacco: Never   Substance  Use Topics     Alcohol use: Yes     Comment: Alcoholic Drinks/day: rare occasions        Objective:      BP (!) 154/91   Pulse 71   Temp 97.8  F (36.6  C)   Resp 17   Wt 86.2 kg (190 lb)   SpO2 97%   BMI 27.26 kg/m    General appearance - alert, well appearing, and in no distress and non-toxic  Chest - clear to auscultation, no wheezes, rales or rhonchi, symmetric air entry  Heart - normal rate, regular rhythm, normal S1, S2, no murmurs, rubs, clicks or gallops  Abdomen - soft, nontender, nondistended, no masses or organomegaly  Back exam - Right-sided CVA tenderness     Lab & Imaging Results    Results for orders placed or performed in visit on 11/18/22   CT Abdomen Pelvis w/o Contrast     Status: None    Narrative    EXAM: CT ABDOMEN PELVIS W/O CONTRAST  LOCATION: [facility]  DATE/TIME: 11/18/2022 4:15 PM    INDICATION: Right flank pain with hematuria  COMPARISON: None.  TECHNIQUE: CT scan of the abdomen and pelvis was performed without oral or IV contrast. Multiplanar reformats were obtained. Dose reduction techniques were used.  CONTRAST: None.    FINDINGS:   LOWER CHEST: A few benign calcified granulomas in the left lower lobe. Visualized lungs are otherwise clear. No pleural effusion. Heart size normal with no pericardial effusion.     HEPATOBILIARY: Liver is normal.  No calcified gallstones or bile duct dilatation.     PANCREAS: Normal.    SPLEEN: Spleen size normal.    ADRENAL GLANDS: Normal.    RIGHT KIDNEY AND URETER: A 3 mm stone in the distal right ureter just proximal to the ureterovesical junction causing proximal obstruction. Nonobstructing right calyceal stones measuring 4 mm and 2 mm.    LEFT KIDNEY AND URETER: Normal.    BLADDER: Normal.    BOWEL: Normal appendix. Bowel is normal with no obstruction or inflammatory change.    LYMPH NODES: No lymphadenopathy.    VASCULATURE: Normal caliber abdominal aorta.      PELVIC ORGANS: Mild prostate enlargement. Small  fat-containing right inguinal hernia.    MUSCULOSKELETAL: Degenerative facet arthritis throughout the lumbar spine.      Impression    IMPRESSION:   1.  A 3 mm stone in the distal right ureter just proximal to the ureterovesical junction causing proximal obstruction.   2.  Nonobstructing right calyceal stones measuring 4 mm and 2 mm.  3.  Mild prostate enlargement.   4.  Small fat-containing right inguinal hernia.   UA macro with reflex to Microscopic and Culture - Clinc Collect     Status: Abnormal    Specimen: Urine, Clean Catch   Result Value Ref Range    Color Urine Yellow Colorless, Straw, Light Yellow, Yellow    Appearance Urine Clear Clear    Glucose Urine Negative Negative mg/dL    Bilirubin Urine Negative Negative    Ketones Urine Trace (A) Negative mg/dL    Specific Gravity Urine 1.025 1.005 - 1.030    Blood Urine Large (A) Negative    pH Urine 6.0 5.0 - 8.0    Protein Albumin Urine Negative Negative mg/dL    Urobilinogen Urine 1.0 0.2, 1.0 E.U./dL    Nitrite Urine Negative Negative    Leukocyte Esterase Urine Trace (A) Negative   Urine Microscopic     Status: Abnormal   Result Value Ref Range    Bacteria Urine Few (A) None Seen /HPF    RBC Urine 10-25 (A) 0-2 /HPF /HPF    WBC Urine 0-5 0-5 /HPF /HPF    Squamous Epithelials Urine None Seen None Seen /LPF    Narrative    Urine Culture not indicated       I personally reviewed these results and discussed findings with the patient.    The use of Dragon/[employer] dictation services was used to construct the content of this note; any grammatical errors are non-intentional. Please contact the author directly if you are in need of any clarification.

## 2022-11-21 ENCOUNTER — HEALTH MAINTENANCE LETTER (OUTPATIENT)
Age: 57
End: 2022-11-21

## 2022-11-24 PROCEDURE — 82365 CALCULUS SPECTROSCOPY: CPT

## 2022-12-01 ENCOUNTER — TELEPHONE (OUTPATIENT)
Dept: UROLOGY | Facility: CLINIC | Age: 57
End: 2022-12-01

## 2022-12-01 ENCOUNTER — APPOINTMENT (OUTPATIENT)
Dept: LAB | Facility: CLINIC | Age: 57
End: 2022-12-01
Payer: COMMERCIAL

## 2022-12-01 ENCOUNTER — TELEPHONE (OUTPATIENT)
Dept: NURSING | Facility: CLINIC | Age: 57
End: 2022-12-01

## 2022-12-01 DIAGNOSIS — N20.1 CALCULUS OF URETER: Primary | ICD-10-CM

## 2022-12-01 DIAGNOSIS — N20.1 CALCULUS OF URETER: ICD-10-CM

## 2022-12-01 NOTE — TELEPHONE ENCOUNTER
Telephone Call:     Pt calling to find out a location to drop of a kidney stone. Pt was seen at [facility] on 11/18/2022 and was given the supplies to drop of the kidney stone after he passed it.     [facility] is the closest location to patient, so he is going to drip it off there.     Smita Ferris RN  [facility] Nurse Advisor 12:42 PM 12/1/2022

## 2022-12-05 LAB
APPEARANCE STONE: NORMAL
COMPN STONE: NORMAL
SPECIMEN WT: 18 MG

## 2022-12-07 ENCOUNTER — HOSPITAL ENCOUNTER (OUTPATIENT)
Facility: CLINIC | Age: 57
End: 2022-12-07
Payer: COMMERCIAL

## 2022-12-08 ENCOUNTER — TELEPHONE (OUTPATIENT)
Dept: UROLOGY | Facility: CLINIC | Age: 57
End: 2022-12-08

## 2022-12-08 NOTE — TELEPHONE ENCOUNTER
Message left for patient to call back to set up a virtual visit to go over stone analysis.  Ioana Vaughn RN

## 2023-03-15 DIAGNOSIS — J45.20 MILD INTERMITTENT ASTHMA, UNSPECIFIED WHETHER COMPLICATED: ICD-10-CM

## 2023-03-15 NOTE — TELEPHONE ENCOUNTER
"Routing refill request to provider for review/approval because:  Failed - Asthma control assessment score within normal limits in last 6 months          Last Written Prescription Date:  6/21/2021  Last Fill Quantity: 1,  # refills: 5   Last office visit provider:  11/18/2022     Requested Prescriptions   Pending Prescriptions Disp Refills     albuterol (PROAIR HFA/PROVENTIL HFA/VENTOLIN HFA) 108 (90 Base) MCG/ACT inhaler [Pharmacy Med Name: ALBUTEROL HFA INH (200 PUFFS) 6.7GM] 13.4 g      Sig: INHALE 2 PUFFS BY MOUTH EVERY 6 HOURS AS NEEDED FOR WHEEZING       Asthma Maintenance Inhalers - Anticholinergics Failed - 3/15/2023  7:16 AM        Failed - Asthma control assessment score within normal limits in last 6 months     Please review ACT score.           Failed - Recent (6 mo) or future (30 days) visit within the authorizing provider's specialty     Patient had office visit in the last 6 months or has a visit in the next 30 days with authorizing provider or within the authorizing provider's specialty.  See \"Patient Info\" tab in inbasket, or \"Choose Columns\" in Meds & Orders section of the refill encounter.            Passed - Patient is age 12 years or older        Passed - Medication is active on med list       Short-Acting Beta Agonist Inhalers Protocol  Failed - 3/15/2023  7:16 AM        Failed - Asthma control assessment score within normal limits in last 6 months     Please review ACT score.           Failed - Recent (6 mo) or future (30 days) visit within the authorizing provider's specialty     Patient had office visit in the last 6 months or has a visit in the next 30 days with authorizing provider or within the authorizing provider's specialty.  See \"Patient Info\" tab in inbasket, or \"Choose Columns\" in Meds & Orders section of the refill encounter.            Passed - Patient is age 12 or older        Passed - Medication is active on med list             Smita Ferris RN 03/15/23 5:27 PM  "

## 2023-03-21 RX ORDER — ALBUTEROL SULFATE 90 UG/1
AEROSOL, METERED RESPIRATORY (INHALATION)
Qty: 13.4 G | Refills: 1 | Status: SHIPPED | OUTPATIENT
Start: 2023-03-21 | End: 2024-03-01

## 2023-04-16 ENCOUNTER — HEALTH MAINTENANCE LETTER (OUTPATIENT)
Age: 58
End: 2023-04-16

## 2024-02-29 SDOH — HEALTH STABILITY: PHYSICAL HEALTH: ON AVERAGE, HOW MANY MINUTES DO YOU ENGAGE IN EXERCISE AT THIS LEVEL?: 120 MIN

## 2024-02-29 SDOH — HEALTH STABILITY: PHYSICAL HEALTH: ON AVERAGE, HOW MANY DAYS PER WEEK DO YOU ENGAGE IN MODERATE TO STRENUOUS EXERCISE (LIKE A BRISK WALK)?: 7 DAYS

## 2024-02-29 ASSESSMENT — ASTHMA QUESTIONNAIRES
ACT_TOTALSCORE: 25
QUESTION_5 LAST FOUR WEEKS HOW WOULD YOU RATE YOUR ASTHMA CONTROL: COMPLETELY CONTROLLED
QUESTION_3 LAST FOUR WEEKS HOW OFTEN DID YOUR ASTHMA SYMPTOMS (WHEEZING, COUGHING, SHORTNESS OF BREATH, CHEST TIGHTNESS OR PAIN) WAKE YOU UP AT NIGHT OR EARLIER THAN USUAL IN THE MORNING: NOT AT ALL
QUESTION_4 LAST FOUR WEEKS HOW OFTEN HAVE YOU USED YOUR RESCUE INHALER OR NEBULIZER MEDICATION (SUCH AS ALBUTEROL): NOT AT ALL
QUESTION_2 LAST FOUR WEEKS HOW OFTEN HAVE YOU HAD SHORTNESS OF BREATH: NOT AT ALL
QUESTION_1 LAST FOUR WEEKS HOW MUCH OF THE TIME DID YOUR ASTHMA KEEP YOU FROM GETTING AS MUCH DONE AT WORK, SCHOOL OR AT HOME: NONE OF THE TIME
ACT_TOTALSCORE: 25

## 2024-02-29 ASSESSMENT — SOCIAL DETERMINANTS OF HEALTH (SDOH): HOW OFTEN DO YOU GET TOGETHER WITH FRIENDS OR RELATIVES?: TWICE A WEEK

## 2024-03-01 ENCOUNTER — OFFICE VISIT (OUTPATIENT)
Dept: FAMILY MEDICINE | Facility: CLINIC | Age: 59
End: 2024-03-01
Payer: COMMERCIAL

## 2024-03-01 VITALS
WEIGHT: 188.8 LBS | OXYGEN SATURATION: 96 % | BODY MASS INDEX: 27.03 KG/M2 | HEIGHT: 70 IN | DIASTOLIC BLOOD PRESSURE: 86 MMHG | TEMPERATURE: 98.3 F | RESPIRATION RATE: 18 BRPM | SYSTOLIC BLOOD PRESSURE: 136 MMHG | HEART RATE: 79 BPM

## 2024-03-01 DIAGNOSIS — K40.90 RIGHT INGUINAL HERNIA: ICD-10-CM

## 2024-03-01 DIAGNOSIS — Z00.00 ENCOUNTER FOR PREVENTATIVE ADULT HEALTH CARE EXAMINATION: Primary | ICD-10-CM

## 2024-03-01 DIAGNOSIS — B35.1 ONYCHOMYCOSIS: ICD-10-CM

## 2024-03-01 DIAGNOSIS — Z12.11 COLON CANCER SCREENING: ICD-10-CM

## 2024-03-01 DIAGNOSIS — N40.0 ENLARGED PROSTATE: ICD-10-CM

## 2024-03-01 DIAGNOSIS — Z87.442 HISTORY OF NEPHROLITHIASIS: ICD-10-CM

## 2024-03-01 DIAGNOSIS — R31.29 OTHER MICROSCOPIC HEMATURIA: ICD-10-CM

## 2024-03-01 DIAGNOSIS — J30.81 ALLERGIC RHINITIS DUE TO ANIMALS: ICD-10-CM

## 2024-03-01 DIAGNOSIS — G89.29 CHRONIC PAIN OF LEFT KNEE: ICD-10-CM

## 2024-03-01 DIAGNOSIS — J45.20 MILD INTERMITTENT ASTHMA, UNSPECIFIED WHETHER COMPLICATED: ICD-10-CM

## 2024-03-01 DIAGNOSIS — M25.562 CHRONIC PAIN OF LEFT KNEE: ICD-10-CM

## 2024-03-01 LAB
ALBUMIN UR-MCNC: NEGATIVE MG/DL
APPEARANCE UR: CLEAR
BILIRUB UR QL STRIP: NEGATIVE
COLOR UR AUTO: YELLOW
GLUCOSE UR STRIP-MCNC: NEGATIVE MG/DL
HBA1C MFR BLD: 5.2 % (ref 0–5.6)
HGB UR QL STRIP: NEGATIVE
KETONES UR STRIP-MCNC: NEGATIVE MG/DL
LEUKOCYTE ESTERASE UR QL STRIP: NEGATIVE
NITRATE UR QL: NEGATIVE
PH UR STRIP: 6 [PH] (ref 5–8)
SP GR UR STRIP: >=1.03 (ref 1–1.03)
UROBILINOGEN UR STRIP-ACNC: 0.2 E.U./DL

## 2024-03-01 PROCEDURE — 80061 LIPID PANEL: CPT | Performed by: STUDENT IN AN ORGANIZED HEALTH CARE EDUCATION/TRAINING PROGRAM

## 2024-03-01 PROCEDURE — G0103 PSA SCREENING: HCPCS | Performed by: STUDENT IN AN ORGANIZED HEALTH CARE EDUCATION/TRAINING PROGRAM

## 2024-03-01 PROCEDURE — 81003 URINALYSIS AUTO W/O SCOPE: CPT | Performed by: STUDENT IN AN ORGANIZED HEALTH CARE EDUCATION/TRAINING PROGRAM

## 2024-03-01 PROCEDURE — 99396 PREV VISIT EST AGE 40-64: CPT | Mod: 25 | Performed by: STUDENT IN AN ORGANIZED HEALTH CARE EDUCATION/TRAINING PROGRAM

## 2024-03-01 PROCEDURE — 90471 IMMUNIZATION ADMIN: CPT | Performed by: STUDENT IN AN ORGANIZED HEALTH CARE EDUCATION/TRAINING PROGRAM

## 2024-03-01 PROCEDURE — 99214 OFFICE O/P EST MOD 30 MIN: CPT | Mod: 25 | Performed by: STUDENT IN AN ORGANIZED HEALTH CARE EDUCATION/TRAINING PROGRAM

## 2024-03-01 PROCEDURE — 83036 HEMOGLOBIN GLYCOSYLATED A1C: CPT | Performed by: STUDENT IN AN ORGANIZED HEALTH CARE EDUCATION/TRAINING PROGRAM

## 2024-03-01 PROCEDURE — 80053 COMPREHEN METABOLIC PANEL: CPT | Performed by: STUDENT IN AN ORGANIZED HEALTH CARE EDUCATION/TRAINING PROGRAM

## 2024-03-01 PROCEDURE — 36415 COLL VENOUS BLD VENIPUNCTURE: CPT | Performed by: STUDENT IN AN ORGANIZED HEALTH CARE EDUCATION/TRAINING PROGRAM

## 2024-03-01 PROCEDURE — 90677 PCV20 VACCINE IM: CPT | Performed by: STUDENT IN AN ORGANIZED HEALTH CARE EDUCATION/TRAINING PROGRAM

## 2024-03-01 RX ORDER — AZELASTINE 1 MG/ML
1 SPRAY, METERED NASAL 2 TIMES DAILY
Qty: 30 ML | Refills: 3 | Status: SHIPPED | OUTPATIENT
Start: 2024-03-01

## 2024-03-01 RX ORDER — CICLOPIROX 80 MG/ML
SOLUTION TOPICAL
Qty: 6.6 ML | Refills: 11 | Status: SHIPPED | OUTPATIENT
Start: 2024-03-01

## 2024-03-01 RX ORDER — FLUTICASONE PROPIONATE 50 MCG
1 SPRAY, SUSPENSION (ML) NASAL DAILY
Qty: 11.1 ML | Refills: 2 | Status: SHIPPED | OUTPATIENT
Start: 2024-03-01

## 2024-03-01 RX ORDER — ALBUTEROL SULFATE 90 UG/1
2 AEROSOL, METERED RESPIRATORY (INHALATION) EVERY 6 HOURS PRN
Qty: 13.4 G | Refills: 1 | Status: SHIPPED | OUTPATIENT
Start: 2024-03-01

## 2024-03-01 NOTE — PROGRESS NOTES
Preventive Care Visit  [facility]  Mat Boss MD, Family Medicine  Mar 1, 2024    Assessment & Plan     Encounter for preventative adult health care examination  Kevni is a 59-year-old male with mild intermittent asthma, enlarged prostate, history of nephrolithiasis, right inguinal hernia and allergic rhinitis who presents for adult preventative exam.  We discussed preventative measures, last colonoscopy was done 10 years ago recommend for repeat colonoscopy, referral placed to [facility].  He was agreeable.  Has had history of prostate that was enlarged with elevated PSA, has had prostate biopsy in 2021 and prostate MRI in 2021, following with [facility].  Will repeat PSA today as he has not seen [facility] now since July 2022.  If still elevated will refer back to [facility].    Recommended for immunizations, he was willing to get the Prevnar 20.  He declined Shingrix, RSV, COVID and influenza vaccines today.      - Pneumococcal 20 Valent Conjugate (Prevnar 20)  - PSA, screen  - Lipid panel reflex to direct LDL Non-fasting  - Hemoglobin A1c  - Comprehensive metabolic panel (BMP + Alb, Alk Phos, ALT, AST, Total. Bili, TP)  - PSA, screen  - Lipid panel reflex to direct LDL Non-fasting  - Hemoglobin A1c  - Comprehensive metabolic panel (BMP + Alb, Alk Phos, ALT, AST, Total. Bili, TP)    Mild intermittent asthma, unspecified whether complicated  Well-controlled with albuterol only, continue albuterol as needed.  - albuterol (PROAIR HFA/PROVENTIL HFA/VENTOLIN HFA) 108 (90 Base) MCG/ACT inhaler  Dispense: 13.4 g; Refill: 1    Colon cancer screening  As above, recommend for repeat colonoscopy, last colonoscopy was normal but done 10 years ago in 2014.    Enlarged prostate  As above enlarged prostate seen by [facility] will repeat PSA today.  - PSA, screen  - PSA, screen    Other microscopic hematuria  Microscopic hematuria was present on urinalysis when patient had  "nephrolithiasis, likely secondary to that however will repeat urinalysis to evaluate for persistent hematuria.  - UA Macroscopic with reflex to Microscopic and Culture - Lab Collect  - UA Macroscopic with reflex to Microscopic and Culture - Lab Collect    History of nephrolithiasis  Has not recurred, encourage patient to keep good hydration.  On composition testing had calcium oxalate crystal.  If recurrent would recommend referral to urology.    Right inguinal hernia  Nonpainful, continue to monitor    Onychomycosis  On fifth and fourth digits of right foot and first digit of left foot.  First digit of left foot only the distal nail is affected.  We discussed options for treatment, we will attempt Penlac.  If not improving may consider total nail culture for fungus, and if positive for fungus, treat with terbinafine.  - ciclopirox (PENLAC) 8 % external solution  Dispense: 6.6 mL; Refill: 11    Allergic rhinitis due to animals  Allegra-D not effective, we will attempt Flonase and azelastine.  If not effective would refer to allergy  - fluticasone (FLONASE) 50 MCG/ACT nasal spray  Dispense: 11.1 mL; Refill: 2  - azelastine (ASTELIN) 0.1 % nasal spray  Dispense: 30 mL; Refill: 3    Chronic pain of left knee  Intermittent and generally brief in duration.  Usually the medial knee is affected.  Exam today is generally normal.  Believe that medial meniscal injury is the likely cause, gave patient home exercise program to prevent recurrence.  Otherwise we will monitor at this time.                BMI  Estimated body mass index is 27.48 kg/m  as calculated from the following:    Height as of this encounter: 1.765 m (5' 9.5\").    Weight as of this encounter: 85.6 kg (188 lb 12.8 oz).       Counseling  Appropriate preventive services were discussed with this patient, including applicable screening as appropriate for fall prevention, nutrition, physical activity, Tobacco-use cessation, weight loss and cognition.  Checklist " reviewing preventive services available has been given to the patient.  Reviewed patient's diet, addressing concerns and/or questions.   He is at risk for psychosocial distress and has been provided with information to reduce risk.       Pierce Brown is a 59 year old, presenting for the following:  Physical (Physical, asthma, prostate numbers, and left knee pain, and toenails color change and dry underneath skin.)        3/1/2024     3:18 PM   Additional Questions   Roomed by LC-LPN   Accompanied by N/A        Health Care Directive  Patient does not have a Health Care Directive or Living Will: Discussed advance care planning with patient; information given to patient to review.    Healthy Habits:     Getting at least 3 servings of Calcium per day:  Yes    Bi-annual eye exam:  NO    Dental care twice a year:  Yes    Sleep apnea or symptoms of sleep apnea:  Sleep apnea    Diet:  Regular (no restrictions)    Frequency of exercise:  2-3 days/week    Duration of exercise:  45-60 minutes    Taking medications regularly:  Yes    Barriers to taking medications:  None    Medication side effects:  None    Additional concerns today:  Yes          Has had left  knee pain.  Sometimes when he fully flexes his knee and then attempts to straighten it again it feels like something was floating around and got in the joint and it is painful. Denies instability of the knee, buckling, locking.  Just has pressure walking on it.  The pain is around a 7-8/10 but does not limit his function, still plays hockey on it, does his work and household chores.  It will feel better when he puts a knee brace on , compression sleeve. Will feel better after 1-2 days.  It will occur 1-2 times a year.    It is starting to feel like it may occur again, so he is being careful with it.         Asthma Follow-Up    Was ACT completed today?  Yes        2/29/2024     7:22 PM   ACT Total Scores   ACT TOTAL SCORE (Goal Greater than or Equal to 20) 25   In  the past 12 months, how many times did you visit the emergency room for your asthma without being admitted to the hospital? 0   In the past 12 months, how many times were you hospitalized overnight because of your asthma? 0        How many days per week do you miss taking your asthma controller medication?  I do not have an asthma controller medication  Please describe any recent triggers for your asthma:  smoke in air, cats, dogs (allergies)  Have you had any Emergency Room Visits, Urgent Care Visits, or Hospital Admissions since your last office visit?  No      2/29/2024   General Health   How would you rate your overall physical health? Good   Feel stress (tense, anxious, or unable to sleep) Only a little   (!) STRESS CONCERN      2/29/2024   Nutrition   Three or more servings of calcium each day? Yes   Diet: Regular (no restrictions)   How many servings of fruit and vegetables per day? (!) 0-1   How many sweetened beverages each day? 0-1         2/29/2024   Exercise   Days per week of moderate/strenous exercise 7 days   Average minutes spent exercising at this level 120 min         2/29/2024   Social Factors   Frequency of gathering with friends or relatives Twice a week   Worry food won't last until get money to buy more No   Food not last or not have enough money for food? No   Do you have housing?  Yes   Are you worried about losing your housing? No   Lack of transportation? No   Unable to get utilities (heat,electricity)? No         2/29/2024   Fall Risk   Fallen 2 or more times in the past year? No   Trouble with walking or balance? No          2/29/2024   Dental   Dentist two times every year? Yes         2/29/2024   TB Screening   Were you born outside of [place]?  No         Today's PHQ-2 Score:       2/29/2024     7:19 PM   PHQ-2 ( 1999 [employer])   Q1: Little interest or pleasure in doing things 0   Q2: Feeling down, depressed or hopeless 0   PHQ-2 Score 0   Q1: Little interest or pleasure in doing things Not  at all   Q2: Feeling down, depressed or hopeless Not at all   PHQ-2 Score 0           2/29/2024   Substance Use   Alcohol more than 3/day or more than 7/wk No   Do you use any other substances recreationally? No     Social History     Tobacco Use    Smoking status: Never    Smokeless tobacco: Never   Substance Use Topics    Alcohol use: Yes     Comment: Alcoholic Drinks/day: rare occasions    Drug use: No             2/29/2024   One time HIV Screening   Previous HIV test? No         2/29/2024   STI Screening   New sexual partner(s) since last STI/HIV test? No   Last PSA:   Prostate Specific Antigen Screen   Date Value Ref Range Status   01/26/2021 7.9 (H) 0.0 - 3.5 ng/mL Final     ASCVD Risk   The 10-year ASCVD risk score (Nayan HUMPHREY, et al., 2019) is: 9.2%    Values used to calculate the score:      Age: 59 years      Sex: Male      Is Non- : No      Diabetic: No      Tobacco smoker: No      Systolic Blood Pressure: 136 mmHg      Is BP treated: No      HDL Cholesterol: 44 mg/dL      Total Cholesterol: 173 mg/dL           Reviewed and updated as needed this visit by Provider                    No past medical history on file.  No past surgical history on file.  Labs reviewed in EPIC  BP Readings from Last 3 Encounters:   03/01/24 136/86   11/18/22 (!) 154/91   06/02/21 112/80    Wt Readings from Last 3 Encounters:   03/01/24 85.6 kg (188 lb 12.8 oz)   11/18/22 86.2 kg (190 lb)   06/02/21 84.4 kg (186 lb)                  Patient Active Problem List   Diagnosis    Allergic Rhinitis    Asthma    Sebaceous Cyst    Inguinal Hernia    Sun-damaged skin     No past surgical history on file.    Social History     Tobacco Use    Smoking status: Never    Smokeless tobacco: Never   Substance Use Topics    Alcohol use: Yes     Comment: Alcoholic Drinks/day: rare occasions     Family History   Problem Relation Age of Onset    Heart Disease Father          Current Outpatient Medications  "  Medication Sig Dispense Refill    albuterol (PROAIR HFA/PROVENTIL HFA/VENTOLIN HFA) 108 (90 Base) MCG/ACT inhaler Inhale 2 puffs into the lungs every 6 hours as needed for wheezing 13.4 g 1    aspirin (ASA) 81 MG chewable tablet Take 81 mg by mouth daily      azelastine (ASTELIN) 0.1 % nasal spray Spray 1 spray into both nostrils 2 times daily 30 mL 3    ciclopirox (PENLAC) 8 % external solution Apply to adjacent skin and affected nails daily.  Remove with alcohol every 7 days, then repeat. 6.6 mL 11    fluticasone (FLONASE) 50 MCG/ACT nasal spray Spray 1 spray into both nostrils daily 11.1 mL 2     Allergies   Allergen Reactions    Pollen Extracts [Pollen Extract] Unknown     Recent Labs   Lab Test 03/01/24  1632 06/02/21  1814 01/26/21  0814   A1C 5.2  --   --    LDL  --   --  116   HDL  --   --  44   TRIG  --   --  65   ALT  --  23  --    CR  --  1.22 1.17   GFRESTIMATED  --  >60 >60   GFRESTBLACK  --  >60 >60   POTASSIUM  --  4.2 4.9   TSH  --  1.51  --           Review of Systems  Constitutional, neuro, ENT, endocrine, pulmonary, cardiac, gastrointestinal, genitourinary, musculoskeletal, integument and psychiatric systems are negative, except as otherwise noted.     Objective    Exam  /86 (BP Location: Right arm, Patient Position: Standing, Cuff Size: Adult Regular)   Pulse 79   Temp 98.3  F (36.8  C) (Oral)   Resp 18   Ht 1.765 m (5' 9.5\")   Wt 85.6 kg (188 lb 12.8 oz)   SpO2 96%   BMI 27.48 kg/m     Estimated body mass index is 27.48 kg/m  as calculated from the following:    Height as of this encounter: 1.765 m (5' 9.5\").    Weight as of this encounter: 85.6 kg (188 lb 12.8 oz).    Physical Exam  GENERAL: alert and no distress  EYES: Eyes grossly normal to inspection, PERRL and conjunctivae and sclerae normal  HENT: ear canals and TM's normal, nose and mouth without ulcers or lesions  NECK: no adenopathy, no asymmetry, masses, or scars  RESP: lungs clear to auscultation - no rales, rhonchi or " wheezes  CV: regular rate and rhythm, normal S1 S2, no S3 or S4, no murmur, click or rub, no peripheral edema  ABDOMEN: soft, nontender, no hepatosplenomegaly, no masses and bowel sounds normal  MS: no gross musculoskeletal defects noted, no edema.  Left knee: patellar crepitus is present with active and passive motion.  Normal strength.  Josh's test negative.  Valgus and varus stress normal nonpainful.  Anterior Lachman normal, posterior drawer test normal.  James test normal Thessaly's test normal.  Some mild pain with single-leg squat test.  SKIN: no suspicious lesions or rashes.  Fourth and fifth nails of right foot are discolored and thickened.  Entire nails affected.  First nail of left foot is discolored however only mildly thickened and distal one third of nail is affected.  Proximal two thirds of nail appears normal.  NEURO: Normal strength and tone, mentation intact and speech normal  PSYCH: mentation appears normal, affect normal/bright      Signed Electronically by: Mat Boss MD

## 2024-03-01 NOTE — PATIENT INSTRUCTIONS
Preventive Care Advice   This is general advice given by our system to help you stay healthy. However, your care team may have specific advice just for you. Please talk to your care team about your preventive care needs.  Nutrition  Eat 5 or more servings of fruits and vegetables each day.  Try wheat bread, brown rice and whole grain pasta (instead of white bread, rice, and pasta).  Get enough calcium and vitamin D. Check the label on foods and aim for 100% of the RDA (recommended daily allowance).  Lifestyle  Exercise at least 150 minutes each week   (30 minutes a day, 5 days a week).  Do muscle strengthening activities 2 days a week. These help control your weight and prevent disease.  No smoking.  Wear sunscreen to prevent skin cancer.  Have a dental exam and cleaning every 6 months.  Yearly exams  See your health care team every year to talk about:  Any changes in your health.  Any medicines your care team has prescribed.  Preventive care, family planning, and ways to prevent chronic diseases.  Shots (vaccines)   HPV shots (up to age 26), if you've never had them before.  Hepatitis B shots (up to age 59), if you've never had them before.  COVID-19 shot: Get this shot when it's due.  Flu shot: Get a flu shot every year.  Tetanus shot: Get a tetanus shot every 10 years.  Pneumococcal, hepatitis A, and RSV shots: Ask your care team if you need these based on your risk.  Shingles shot (for age 50 and up).  General health tests  Diabetes screening:  Starting at age 35, Get screened for diabetes at least every 3 years.  If you are younger than age 35, ask your care team if you should be screened for diabetes.  Cholesterol test: At age 39, start having a cholesterol test every 5 years, or more often if advised.  Bone density scan (DEXA): At age 50, ask your care team if you should have this scan for osteoporosis (brittle bones).  Hepatitis C: Get tested at least once in your life.  STIs (sexually transmitted  infections)  Before age 24: Ask your care team if you should be screened for STIs.  After age 24: Get screened for STIs if you're at risk. You are at risk for STIs (including HIV) if:  You are sexually active with more than one person.  You don't use condoms every time.  You or a partner was diagnosed with a sexually transmitted infection.  If you are at risk for HIV, ask about PrEP medicine to prevent HIV.  Get tested for HIV at least once in your life, whether you are at risk for HIV or not.  Cancer screening tests  Cervical cancer screening: If you have a cervix, begin getting regular cervical cancer screening tests at age 21. Most people who have regular screenings with normal results can stop after age 65. Talk about this with your provider.  Breast cancer scan (mammogram): If you've ever had breasts, begin having regular mammograms starting at age 40. This is a scan to check for breast cancer.  Colon cancer screening: It is important to start screening for colon cancer at age 45.  Have a colonoscopy test every 10 years (or more often if you're at risk) Or, ask your provider about stool tests like a FIT test every year or Cologuard test every 3 years.  To learn more about your testing options, visit: https://www.[employer]/255615.pdf.  For help making a decision, visit: https://bit.ly/wq93487.  Prostate cancer screening test: If you have a prostate and are age 55 to 69, ask your provider if you would benefit from a yearly prostate cancer screening test.  Lung cancer screening: If you are a current or former smoker age 50 to 80, ask your care team if ongoing lung cancer screenings are right for you.  For informational purposes only. Not to replace the advice of your health care provider. Copyright   2023 [employer]. All rights reserved. Clinically reviewed by the [facility] Transitions Program. [employer] 705511 - REV 01/24.    Learning About Stress  What is stress?     Stress is your  body's response to a hard situation. Your body can have a physical, emotional, or mental response. Stress is a fact of life for most people, and it affects everyone differently. What causes stress for you may not be stressful for someone else.  A lot of things can cause stress. You may feel stress when you go on a job interview, take a test, or run a race. This kind of short-term stress is normal and even useful. It can help you if you need to work hard or react quickly. For example, stress can help you finish an important job on time.  Long-term stress is caused by ongoing stressful situations or events. Examples of long-term stress include long-term health problems, ongoing problems at work, or conflicts in your family. Long-term stress can harm your health.  How does stress affect your health?  When you are stressed, your body responds as though you are in danger. It makes hormones that speed up your heart, make you breathe faster, and give you a burst of energy. This is called the fight-or-flight stress response. If the stress is over quickly, your body goes back to normal and no harm is done.  But if stress happens too often or lasts too long, it can have bad effects. Long-term stress can make you more likely to get sick, and it can make symptoms of some diseases worse. If you tense up when you are stressed, you may develop neck, shoulder, or low back pain. Stress is linked to high blood pressure and heart disease.  Stress also harms your emotional health. It can make you nevarez, tense, or depressed. Your relationships may suffer, and you may not do well at work or school.  What can you do to manage stress?  You can try these things to help manage stress:   Do something active. Exercise or activity can help reduce stress. Walking is a great way to get started. Even everyday activities such as housecleaning or yard work can help.  Try yoga or marleni chi. These techniques combine exercise and meditation. You may need  some training at first to learn them.  Do something you enjoy. For example, listen to music or go to a movie. Practice your hobby or do volunteer work.  Meditate. This can help you relax, because you are not worrying about what happened before or what may happen in the future.  Do guided imagery. Imagine yourself in any setting that helps you feel calm. You can use online videos, books, or a teacher to guide you.  Do breathing exercises. For example:  From a standing position, bend forward from the waist with your knees slightly bent. Let your arms dangle close to the floor.  Breathe in slowly and deeply as you return to a standing position. Roll up slowly and lift your head last.  Hold your breath for just a few seconds in the standing position.  Breathe out slowly and bend forward from the waist.  Let your feelings out. Talk, laugh, cry, and express anger when you need to. Talking with supportive friends or family, a counselor, or a alfonso leader about your feelings is a healthy way to relieve stress. Avoid discussing your feelings with people who make you feel worse.  Write. It may help to write about things that are bothering you. This helps you find out how much stress you feel and what is causing it. When you know this, you can find better ways to cope.  What can you do to prevent stress?  You might try some of these things to help prevent stress:  Manage your time. This helps you find time to do the things you want and need to do.  Get enough sleep. Your body recovers from the stresses of the day while you are sleeping.  Get support. Your family, friends, and community can make a difference in how you experience stress.  Limit your news feed. Avoid or limit time on social media or news that may make you feel stressed.  Do something active. Exercise or activity can help reduce stress. Walking is a great way to get started.  Where can you learn more?  Go to https://www.healthwise.net/patiented  Enter N032 in the  "search box to learn more about \"Learning About Stress.\"  Current as of: February 26, 2023               Content Version: 13.8    7004-2752 [employer].   Care instructions adapted under license by your healthcare professional. If you have questions about a medical condition or this instruction, always ask your healthcare professional. [employer] disclaims any warranty or liability for your use of this information.      "

## 2024-03-03 DIAGNOSIS — R97.20 ELEVATED PROSTATE SPECIFIC ANTIGEN (PSA): Primary | ICD-10-CM

## 2024-03-03 DIAGNOSIS — E78.2 MIXED HYPERLIPIDEMIA: ICD-10-CM

## 2024-03-03 LAB
ALBUMIN SERPL BCG-MCNC: 4.6 G/DL (ref 3.5–5.2)
ALP SERPL-CCNC: 98 U/L (ref 40–150)
ALT SERPL W P-5'-P-CCNC: 27 U/L (ref 0–70)
ANION GAP SERPL CALCULATED.3IONS-SCNC: 10 MMOL/L (ref 7–15)
AST SERPL W P-5'-P-CCNC: 24 U/L (ref 0–45)
BILIRUB SERPL-MCNC: 0.5 MG/DL
BUN SERPL-MCNC: 16.6 MG/DL (ref 8–23)
CALCIUM SERPL-MCNC: 9 MG/DL (ref 8.8–10.2)
CHLORIDE SERPL-SCNC: 104 MMOL/L (ref 98–107)
CHOLEST SERPL-MCNC: 180 MG/DL
CREAT SERPL-MCNC: 1.16 MG/DL (ref 0.67–1.17)
DEPRECATED HCO3 PLAS-SCNC: 26 MMOL/L (ref 22–29)
EGFRCR SERPLBLD CKD-EPI 2021: 72 ML/MIN/1.73M2
FASTING STATUS PATIENT QL REPORTED: YES
GLUCOSE SERPL-MCNC: 89 MG/DL (ref 70–99)
HDLC SERPL-MCNC: 45 MG/DL
LDLC SERPL CALC-MCNC: 116 MG/DL
NONHDLC SERPL-MCNC: 135 MG/DL
POTASSIUM SERPL-SCNC: 4.4 MMOL/L (ref 3.4–5.3)
PROT SERPL-MCNC: 7.1 G/DL (ref 6.4–8.3)
PSA SERPL DL<=0.01 NG/ML-MCNC: 16.4 NG/ML (ref 0–4.5)
SODIUM SERPL-SCNC: 140 MMOL/L (ref 135–145)
TRIGL SERPL-MCNC: 93 MG/DL

## 2024-03-03 RX ORDER — ROSUVASTATIN CALCIUM 5 MG/1
5 TABLET, COATED ORAL DAILY
Qty: 90 TABLET | Refills: 3 | Status: SHIPPED | OUTPATIENT
Start: 2024-03-03

## 2025-01-30 ENCOUNTER — PATIENT OUTREACH (OUTPATIENT)
Dept: CARE COORDINATION | Facility: CLINIC | Age: 60
End: 2025-01-30
Payer: COMMERCIAL

## 2025-02-13 ENCOUNTER — PATIENT OUTREACH (OUTPATIENT)
Dept: CARE COORDINATION | Facility: CLINIC | Age: 60
End: 2025-02-13
Payer: COMMERCIAL

## 2025-04-19 ENCOUNTER — HEALTH MAINTENANCE LETTER (OUTPATIENT)
Age: 60
End: 2025-04-19

## 2025-05-12 ENCOUNTER — OFFICE VISIT (OUTPATIENT)
Dept: FAMILY MEDICINE | Facility: CLINIC | Age: 60
End: 2025-05-12
Attending: STUDENT IN AN ORGANIZED HEALTH CARE EDUCATION/TRAINING PROGRAM
Payer: COMMERCIAL

## 2025-05-12 ENCOUNTER — ANCILLARY PROCEDURE (OUTPATIENT)
Dept: GENERAL RADIOLOGY | Facility: CLINIC | Age: 60
End: 2025-05-12
Attending: STUDENT IN AN ORGANIZED HEALTH CARE EDUCATION/TRAINING PROGRAM
Payer: COMMERCIAL

## 2025-05-12 VITALS
SYSTOLIC BLOOD PRESSURE: 130 MMHG | BODY MASS INDEX: 26.26 KG/M2 | HEIGHT: 70 IN | TEMPERATURE: 97.7 F | WEIGHT: 183.4 LBS | DIASTOLIC BLOOD PRESSURE: 72 MMHG | HEART RATE: 75 BPM | RESPIRATION RATE: 12 BRPM | OXYGEN SATURATION: 96 %

## 2025-05-12 DIAGNOSIS — J30.81 ALLERGIC RHINITIS DUE TO ANIMALS: ICD-10-CM

## 2025-05-12 DIAGNOSIS — Z13.6 SCREENING FOR CARDIOVASCULAR CONDITION: ICD-10-CM

## 2025-05-12 DIAGNOSIS — Z00.00 ROUTINE GENERAL MEDICAL EXAMINATION AT A HEALTH CARE FACILITY: Primary | ICD-10-CM

## 2025-05-12 DIAGNOSIS — B35.1 ONYCHOMYCOSIS: ICD-10-CM

## 2025-05-12 DIAGNOSIS — R97.20 ELEVATED PROSTATE SPECIFIC ANTIGEN (PSA): ICD-10-CM

## 2025-05-12 DIAGNOSIS — G89.29 CHRONIC PAIN OF LEFT KNEE: ICD-10-CM

## 2025-05-12 DIAGNOSIS — N40.0 ENLARGED PROSTATE: ICD-10-CM

## 2025-05-12 DIAGNOSIS — M25.562 CHRONIC PAIN OF LEFT KNEE: ICD-10-CM

## 2025-05-12 DIAGNOSIS — J45.20 MILD INTERMITTENT ASTHMA, UNSPECIFIED WHETHER COMPLICATED: ICD-10-CM

## 2025-05-12 DIAGNOSIS — Z12.11 SCREEN FOR COLON CANCER: ICD-10-CM

## 2025-05-12 DIAGNOSIS — E78.2 MIXED HYPERLIPIDEMIA: ICD-10-CM

## 2025-05-12 DIAGNOSIS — K40.90 RIGHT INGUINAL HERNIA: ICD-10-CM

## 2025-05-12 LAB
CHOLEST SERPL-MCNC: 187 MG/DL
FASTING STATUS PATIENT QL REPORTED: NO
HDLC SERPL-MCNC: 47 MG/DL
LDLC SERPL CALC-MCNC: 115 MG/DL
NONHDLC SERPL-MCNC: 140 MG/DL
PSA SERPL DL<=0.01 NG/ML-MCNC: 10.9 NG/ML (ref 0–4.5)
TRIGL SERPL-MCNC: 125 MG/DL

## 2025-05-12 PROCEDURE — G0103 PSA SCREENING: HCPCS | Performed by: STUDENT IN AN ORGANIZED HEALTH CARE EDUCATION/TRAINING PROGRAM

## 2025-05-12 PROCEDURE — 73560 X-RAY EXAM OF KNEE 1 OR 2: CPT | Mod: TC | Performed by: RADIOLOGY

## 2025-05-12 PROCEDURE — 80061 LIPID PANEL: CPT | Performed by: STUDENT IN AN ORGANIZED HEALTH CARE EDUCATION/TRAINING PROGRAM

## 2025-05-12 PROCEDURE — 36415 COLL VENOUS BLD VENIPUNCTURE: CPT | Performed by: STUDENT IN AN ORGANIZED HEALTH CARE EDUCATION/TRAINING PROGRAM

## 2025-05-12 PROCEDURE — 87101 SKIN FUNGI CULTURE: CPT | Performed by: STUDENT IN AN ORGANIZED HEALTH CARE EDUCATION/TRAINING PROGRAM

## 2025-05-12 SDOH — HEALTH STABILITY: PHYSICAL HEALTH: ON AVERAGE, HOW MANY MINUTES DO YOU ENGAGE IN EXERCISE AT THIS LEVEL?: 60 MIN

## 2025-05-12 SDOH — HEALTH STABILITY: PHYSICAL HEALTH: ON AVERAGE, HOW MANY DAYS PER WEEK DO YOU ENGAGE IN MODERATE TO STRENUOUS EXERCISE (LIKE A BRISK WALK)?: 3 DAYS

## 2025-05-12 ASSESSMENT — ASTHMA QUESTIONNAIRES
QUESTION_1 LAST FOUR WEEKS HOW MUCH OF THE TIME DID YOUR ASTHMA KEEP YOU FROM GETTING AS MUCH DONE AT WORK, SCHOOL OR AT HOME: NONE OF THE TIME
QUESTION_5 LAST FOUR WEEKS HOW WOULD YOU RATE YOUR ASTHMA CONTROL: WELL CONTROLLED
QUESTION_4 LAST FOUR WEEKS HOW OFTEN HAVE YOU USED YOUR RESCUE INHALER OR NEBULIZER MEDICATION (SUCH AS ALBUTEROL): NOT AT ALL
QUESTION_2 LAST FOUR WEEKS HOW OFTEN HAVE YOU HAD SHORTNESS OF BREATH: NOT AT ALL
QUESTION_3 LAST FOUR WEEKS HOW OFTEN DID YOUR ASTHMA SYMPTOMS (WHEEZING, COUGHING, SHORTNESS OF BREATH, CHEST TIGHTNESS OR PAIN) WAKE YOU UP AT NIGHT OR EARLIER THAN USUAL IN THE MORNING: NOT AT ALL
ACT_TOTALSCORE: 24

## 2025-05-12 ASSESSMENT — SOCIAL DETERMINANTS OF HEALTH (SDOH): HOW OFTEN DO YOU GET TOGETHER WITH FRIENDS OR RELATIVES?: TWICE A WEEK

## 2025-05-12 NOTE — PATIENT INSTRUCTIONS
Patient Education   Preventive Care Advice   This is general advice given by our system to help you stay healthy. However, your care team may have specific advice just for you. Please talk to your care team about your preventive care needs.  Nutrition  Eat 5 or more servings of fruits and vegetables each day.  Try wheat bread, brown rice and whole grain pasta (instead of white bread, rice, and pasta).  Get enough calcium and vitamin D. Check the label on foods and aim for 100% of the RDA (recommended daily allowance).  Lifestyle  Exercise at least 150 minutes each week  (30 minutes a day, 5 days a week).  Do muscle strengthening activities 2 days a week. These help control your weight and prevent disease.  No smoking.  Wear sunscreen to prevent skin cancer.  Have a dental exam and cleaning every 6 months.  Yearly exams  See your health care team every year to talk about:  Any changes in your health.  Any medicines your care team has prescribed.  Preventive care, family planning, and ways to prevent chronic diseases.  Shots (vaccines)   HPV shots (up to age 26), if you've never had them before.  Hepatitis B shots (up to age 59), if you've never had them before.  COVID-19 shot: Get this shot when it's due.  Flu shot: Get a flu shot every year.  Tetanus shot: Get a tetanus shot every 10 years.  Pneumococcal, hepatitis A, and RSV shots: Ask your care team if you need these based on your risk.  Shingles shot (for age 50 and up)  General health tests  Diabetes screening:  Starting at age 35, Get screened for diabetes at least every 3 years.  If you are younger than age 35, ask your care team if you should be screened for diabetes.  Cholesterol test: At age 39, start having a cholesterol test every 5 years, or more often if advised.  Bone density scan (DEXA): At age 50, ask your care team if you should have this scan for osteoporosis (brittle bones).  Hepatitis C: Get tested at least once in your life.  STIs (sexually  transmitted infections)  Before age 24: Ask your care team if you should be screened for STIs.  After age 24: Get screened for STIs if you're at risk. You are at risk for STIs (including HIV) if:  You are sexually active with more than one person.  You don't use condoms every time.  You or a partner was diagnosed with a sexually transmitted infection.  If you are at risk for HIV, ask about PrEP medicine to prevent HIV.  Get tested for HIV at least once in your life, whether you are at risk for HIV or not.  Cancer screening tests  Cervical cancer screening: If you have a cervix, begin getting regular cervical cancer screening tests starting at age 21.  Breast cancer scan (mammogram): If you've ever had breasts, begin having regular mammograms starting at age 40. This is a scan to check for breast cancer.  Colon cancer screening: It is important to start screening for colon cancer at age 45.  Have a colonoscopy test every 10 years (or more often if you're at risk) Or, ask your provider about stool tests like a FIT test every year or Cologuard test every 3 years.  To learn more about your testing options, visit:   .  For help making a decision, visit:   https://bit.ly/us60868.  Prostate cancer screening test: If you have a prostate, ask your care team if a prostate cancer screening test (PSA) at age 55 is right for you.  Lung cancer screening: If you are a current or former smoker ages 50 to 80, ask your care team if ongoing lung cancer screenings are right for you.  For informational purposes only. Not to replace the advice of your health care provider. Copyright   2023 [email or web address]. All rights reserved. Clinically reviewed by the [facility] Transitions Program. [employer] [id number] - REV 01/24.

## 2025-05-12 NOTE — PROGRESS NOTES
Preventive Care Visit  [facility] WILLIAMS Boss MD, Family Medicine  May 12, 2025      Assessment & Plan     Routine general medical examination at a health care facility  Kevin is a 60-year-old male with history of mild intermittent asthma well-controlled, enlarged prostate status post prostate biopsy, history of nephrolithiasis, right inguinal hernia, chronic left knee pain and allergic rhinitis presenting for adult preventative exam.    We discussed repeating colonoscopy as we did last year, he was in agreement to repeat colonoscopy referral placed to [facility].  Last colonoscopy done 2014 and was normal at that time.    Has had history of enlarged prostate, prostate biopsy in 2021, MRI in 2021, repeat biopsy in 2024 after an increase in PSA was normal.  We will repeat a PSA and encouraged him to go back to [place] [facility] for annual follow-up    We discussed vaccination, recommended for Shingrix vaccination he was agreeable to this.  He wishes to wait on RSV vaccination at this time.  Declines COVID vaccination.    Screen for colon cancer    - Colonoscopy Screening  Referral    Screening for cardiovascular condition      Enlarged prostate  See above  - PSA, screen  - Adult Urology  Referral    Elevated prostate specific antigen (PSA)  See above    Mild intermittent asthma, unspecified whether complicated  Well-controlled, using albuterol once or twice a month, continue current treatment.    Right inguinal hernia  Nonpainful continue to monitor    Onychomycosis  Did not improve with Penlac on 4th and 5th the digits of right foot and first digit of left foot.  He would like to attempt terbinafine, but will do a toenail culture today and send this for fungal nail culture, we will get a positive fungal nail culture prior to starting terbinafine.  - Fungus Culture,  skin, hair, or nail    Allergic rhinitis due to animals  Reports that antihistamine has been effective for him in  "the past, Allegra-D and/or Flonase and azelastine have been effective.  He however would like to attempt allergy shots again as these had been effective for him when he was a child.  Referral placed to allergy.  - Adult Allergy/Asthma  Referral    Chronic pain of left knee    Intermittent and generally brief in duration, medial knee and posterior knee affected.  Believe that medial meniscal injury is likely the cause, gave him an exercise program previously did not improve his pain.  He would like to see an orthopedist to see if surgery is needed, recommend that we get an x-ray and MRI as well as the referral, referral to orthopedics placed.  Discussed physical therapy, he reports he would not wish to do physical therapy as he does not believe would be effective.        - XR Knee Standing Left 2 Views  - MR Knee Left w/o Contrast    Mixed hyperlipidemia  Has 10-year ASCVD risk of 9% based on prior panel.  Recommend we repeat panel today, if still above 7.5%, patient would be agreeable to attempting a CT calcium score to get further information prior to starting any cholesterol medication.    Recommend for healthy living, 150 minutes exercise weekly as well as eating a Mediterranean diet to lower cholesterol levels.  - Lipid panel reflex to direct LDL Non-fasting              BMI  Estimated body mass index is 26.61 kg/m  as calculated from the following:    Height as of this encounter: 1.768 m (5' 9.61\").    Weight as of this encounter: 83.2 kg (183 lb 6.4 oz).   Weight management plan: Discussed healthy diet and exercise guidelines    Counseling  Appropriate preventive services were addressed with this patient via screening, questionnaire, or discussion as appropriate for fall prevention, nutrition, physical activity, Tobacco-use cessation, social engagement, weight loss and cognition.  Checklist reviewing preventive services available has been given to the patient.  Reviewed patient's diet, addressing " concerns and/or questions.   He is at risk for lack of exercise and has been provided with information to increase physical activity for the benefit of his well-being.           Subjective   Kevin is a 60 year old, presenting for the following:  Physical (Non-fasting - would like labs if needed. Discuss colonoscopy and urology referral. Left knee pain ongoing for 8 years, might have been due to an old injury in the past. Never had it looked at. Pain is intermittent, maybe once a month.)        5/12/2025     2:42 PM   Additional Questions   Roomed by GADIEL YOUNG          HPI  Reports he still has a thickened toenail on the 4th and 5th digits of his right foot and on the big toe of his left foot, Penlac did not seem to help very much.  He continues to have right knee pain, there is no locking or buckling.  He just feels painful from time to time without any known triggers.  Only thing that seems to occurs when he fully flexes his knee and then attempted straight and again feel like something is floating around and gets in the joint becomes painful.  When it occurs, pain can be 7-8 out of 10 but does not limit his function he can still play hockey.  Feels better if he puts on a compression sleeve or knee brace.  It occurs 1-2 times a month which is more than last year.         Advance Care Planning    Discussed advance care planning with patient; however, patient declined at this time.        5/12/2025   General Health   How would you rate your overall physical health? Good   Feel stress (tense, anxious, or unable to sleep) Only a little   (!) STRESS CONCERN      5/12/2025   Nutrition   Three or more servings of calcium each day? Yes   Diet: Regular (no restrictions)   How many servings of fruit and vegetables per day? (!) 0-1   How many sweetened beverages each day? (!) 4+         5/12/2025   Exercise   Days per week of moderate/strenous exercise 3 days   Average minutes spent exercising at this level 60 min          5/12/2025   Social Factors   Frequency of gathering with friends or relatives Twice a week   Worry food won't last until get money to buy more No   Food not last or not have enough money for food? No   Do you have housing? (Housing is defined as stable permanent housing and does not include staying outside in a car, in a tent, in an abandoned building, in an overnight shelter, or couch-surfing.) Yes   Are you worried about losing your housing? No   Lack of transportation? No   Unable to get utilities (heat,electricity)? No         5/12/2025   Fall Risk   Fallen 2 or more times in the past year? No   Trouble with walking or balance? No          5/12/2025   Dental   Dentist two times every year? Yes         Today's PHQ-2 Score:       5/12/2025     2:40 PM   PHQ-2 ( 1999 [employer])   Q1: Little interest or pleasure in doing things 0   Q2: Feeling down, depressed or hopeless 0   PHQ-2 Score 0    Q1: Little interest or pleasure in doing things Not at all   Q2: Feeling down, depressed or hopeless Not at all   PHQ-2 Score 0       Patient-reported           5/12/2025   Substance Use   Alcohol more than 3/day or more than 7/wk No   Do you use any other substances recreationally? No     Social History     Tobacco Use    Smoking status: Never     Passive exposure: Never    Smokeless tobacco: Never   Vaping Use    Vaping status: Never Used   Substance Use Topics    Alcohol use: Yes     Comment: Alcoholic Drinks/day: rare occasions    Drug use: No           5/12/2025   STI Screening   New sexual partner(s) since last STI/HIV test? No   Last PSA:   Prostate Specific Antigen Screen   Date Value Ref Range Status   03/01/2024 16.40 (H) 0.00 - 4.50 ng/mL Final   01/26/2021 7.9 (H) 0.0 - 3.5 ng/mL Final     ASCVD Risk   The 10-year ASCVD risk score (Nayan HUMPHREY, et al., 2019) is: 9.4%    Values used to calculate the score:      Age: 60 years      Sex: Male      Is Non- : No      Diabetic: No      Tobacco  "smoker: No      Systolic Blood Pressure: 130 mmHg      Is BP treated: No      HDL Cholesterol: 45 mg/dL      Total Cholesterol: 180 mg/dL           Reviewed and updated as needed this visit by Provider   Tobacco  Allergies  Meds  Problems  Med Hx  Surg Hx  Fam Hx     Sexual Activity                   Objective    Exam  /72 (BP Location: Right arm, Patient Position: Sitting, Cuff Size: Adult Regular)   Pulse 75   Temp 97.7  F (36.5  C)   Resp 12   Ht 1.768 m (5' 9.61\")   Wt 83.2 kg (183 lb 6.4 oz)   SpO2 96%   BMI 26.61 kg/m     Estimated body mass index is 26.61 kg/m  as calculated from the following:    Height as of this encounter: 1.768 m (5' 9.61\").    Weight as of this encounter: 83.2 kg (183 lb 6.4 oz).    Physical Exam  GENERAL: alert and no distress  EYES: Eyes grossly normal to inspection, PERRL and conjunctivae and sclerae normal  HENT: ear canals and TM's normal, nose and mouth without ulcers or lesions  NECK: no adenopathy, no asymmetry, masses, or scars  RESP: lungs clear to auscultation - no rales, rhonchi or wheezes  CV: regular rate and rhythm, normal S1 S2, no S3 or S4, no murmur, click or rub, no peripheral edema  ABDOMEN: soft, nontender, no hepatosplenomegaly, no masses and bowel sounds normal  MS: no gross musculoskeletal defects noted, no edema  SKIN: Toenails, thickened and yellow discoloration on 4th and 5th digit of right foot and first digit of left foot, entire nails affected.  NEURO: Normal strength and tone, mentation intact and speech normal  PSYCH: mentation appears normal, affect normal/bright    The longitudinal plan of care for the diagnosis(es)/condition(s) as documented were addressed during this visit. Due to the added complexity in care, I will continue to support Kevin in the subsequent management and with ongoing continuity of care.      Signed Electronically by: Mat Boss MD    "

## 2025-05-13 ENCOUNTER — PATIENT OUTREACH (OUTPATIENT)
Dept: CARE COORDINATION | Facility: CLINIC | Age: 60
End: 2025-05-13
Payer: COMMERCIAL

## 2025-05-13 ENCOUNTER — RESULTS FOLLOW-UP (OUTPATIENT)
Dept: FAMILY MEDICINE | Facility: CLINIC | Age: 60
End: 2025-05-13

## 2025-05-13 DIAGNOSIS — B35.1 ONYCHOMYCOSIS: ICD-10-CM

## 2025-05-13 DIAGNOSIS — E78.2 MIXED HYPERLIPIDEMIA: Primary | ICD-10-CM

## 2025-05-13 DIAGNOSIS — R97.20 ELEVATED PROSTATE SPECIFIC ANTIGEN (PSA): ICD-10-CM

## 2025-05-15 ENCOUNTER — PATIENT OUTREACH (OUTPATIENT)
Dept: CARE COORDINATION | Facility: CLINIC | Age: 60
End: 2025-05-15
Payer: COMMERCIAL

## 2025-05-15 LAB — BACTERIA SPEC CULT: ABNORMAL

## 2025-05-18 LAB — BACTERIA SPEC CULT: ABNORMAL

## 2025-05-30 ENCOUNTER — HOSPITAL ENCOUNTER (OUTPATIENT)
Dept: CT IMAGING | Facility: CLINIC | Age: 60
Discharge: HOME | End: 2025-05-30
Attending: STUDENT IN AN ORGANIZED HEALTH CARE EDUCATION/TRAINING PROGRAM | Admitting: STUDENT IN AN ORGANIZED HEALTH CARE EDUCATION/TRAINING PROGRAM
Payer: COMMERCIAL

## 2025-05-30 DIAGNOSIS — E78.2 MIXED HYPERLIPIDEMIA: ICD-10-CM

## 2025-05-30 LAB
CV CALCIUM SCORE AGATSTON LM: 0
CV CALCIUM SCORING AGATSON LAD: 10
CV CALCIUM SCORING AGATSTON CX: 0
CV CALCIUM SCORING AGATSTON RCA: 0
CV CALCIUM SCORING AGATSTON TOTAL: 10

## 2025-05-30 PROCEDURE — 75571 CT HRT W/O DYE W/CA TEST: CPT

## 2025-05-30 PROCEDURE — 75571 CT HRT W/O DYE W/CA TEST: CPT | Mod: 26 | Performed by: INTERNAL MEDICINE

## 2025-06-02 ENCOUNTER — RESULTS FOLLOW-UP (OUTPATIENT)
Dept: FAMILY MEDICINE | Facility: CLINIC | Age: 60
End: 2025-06-02

## 2025-06-03 ENCOUNTER — OFFICE VISIT (OUTPATIENT)
Dept: FAMILY MEDICINE | Facility: CLINIC | Age: 60
End: 2025-06-03
Payer: COMMERCIAL

## 2025-06-03 ENCOUNTER — RESULTS FOLLOW-UP (OUTPATIENT)
Dept: FAMILY MEDICINE | Facility: CLINIC | Age: 60
End: 2025-06-03

## 2025-06-03 VITALS
TEMPERATURE: 97.2 F | WEIGHT: 182.9 LBS | RESPIRATION RATE: 12 BRPM | SYSTOLIC BLOOD PRESSURE: 120 MMHG | DIASTOLIC BLOOD PRESSURE: 88 MMHG | HEIGHT: 70 IN | BODY MASS INDEX: 26.18 KG/M2 | HEART RATE: 78 BPM | OXYGEN SATURATION: 97 %

## 2025-06-03 DIAGNOSIS — R93.1 AGATSTON CAC SCORE, <100: ICD-10-CM

## 2025-06-03 DIAGNOSIS — J45.20 MILD INTERMITTENT ASTHMA, UNSPECIFIED WHETHER COMPLICATED: ICD-10-CM

## 2025-06-03 DIAGNOSIS — S83.8X2D INJURY OF MENISCUS OF LEFT KNEE, SUBSEQUENT ENCOUNTER: ICD-10-CM

## 2025-06-03 DIAGNOSIS — E78.2 MIXED HYPERLIPIDEMIA: ICD-10-CM

## 2025-06-03 DIAGNOSIS — B35.1 ONYCHOMYCOSIS: Primary | ICD-10-CM

## 2025-06-03 LAB
ALBUMIN SERPL BCG-MCNC: 4.2 G/DL (ref 3.5–5.2)
ALP SERPL-CCNC: 84 U/L (ref 40–150)
ALT SERPL W P-5'-P-CCNC: 23 U/L (ref 0–70)
ANION GAP SERPL CALCULATED.3IONS-SCNC: 8 MMOL/L (ref 7–15)
AST SERPL W P-5'-P-CCNC: 25 U/L (ref 0–45)
BILIRUB SERPL-MCNC: 0.6 MG/DL
BUN SERPL-MCNC: 12.9 MG/DL (ref 8–23)
CALCIUM SERPL-MCNC: 8.9 MG/DL (ref 8.8–10.4)
CHLORIDE SERPL-SCNC: 107 MMOL/L (ref 98–107)
CREAT SERPL-MCNC: 1.25 MG/DL (ref 0.67–1.17)
EGFRCR SERPLBLD CKD-EPI 2021: 66 ML/MIN/1.73M2
GLUCOSE SERPL-MCNC: 108 MG/DL (ref 70–99)
HCO3 SERPL-SCNC: 26 MMOL/L (ref 22–29)
POTASSIUM SERPL-SCNC: 4.5 MMOL/L (ref 3.4–5.3)
PROT SERPL-MCNC: 6.6 G/DL (ref 6.4–8.3)
SODIUM SERPL-SCNC: 141 MMOL/L (ref 135–145)

## 2025-06-03 PROCEDURE — 90471 IMMUNIZATION ADMIN: CPT | Performed by: STUDENT IN AN ORGANIZED HEALTH CARE EDUCATION/TRAINING PROGRAM

## 2025-06-03 PROCEDURE — 36415 COLL VENOUS BLD VENIPUNCTURE: CPT | Performed by: STUDENT IN AN ORGANIZED HEALTH CARE EDUCATION/TRAINING PROGRAM

## 2025-06-03 PROCEDURE — 90750 HZV VACC RECOMBINANT IM: CPT | Performed by: STUDENT IN AN ORGANIZED HEALTH CARE EDUCATION/TRAINING PROGRAM

## 2025-06-03 PROCEDURE — 3074F SYST BP LT 130 MM HG: CPT | Performed by: STUDENT IN AN ORGANIZED HEALTH CARE EDUCATION/TRAINING PROGRAM

## 2025-06-03 PROCEDURE — 80053 COMPREHEN METABOLIC PANEL: CPT | Performed by: STUDENT IN AN ORGANIZED HEALTH CARE EDUCATION/TRAINING PROGRAM

## 2025-06-03 PROCEDURE — 3079F DIAST BP 80-89 MM HG: CPT | Performed by: STUDENT IN AN ORGANIZED HEALTH CARE EDUCATION/TRAINING PROGRAM

## 2025-06-03 PROCEDURE — G2211 COMPLEX E/M VISIT ADD ON: HCPCS | Performed by: STUDENT IN AN ORGANIZED HEALTH CARE EDUCATION/TRAINING PROGRAM

## 2025-06-03 PROCEDURE — 99214 OFFICE O/P EST MOD 30 MIN: CPT | Mod: 25 | Performed by: STUDENT IN AN ORGANIZED HEALTH CARE EDUCATION/TRAINING PROGRAM

## 2025-06-03 RX ORDER — TERBINAFINE HYDROCHLORIDE 250 MG/1
250 TABLET ORAL DAILY
Qty: 90 TABLET | Refills: 0 | Status: SHIPPED | OUTPATIENT
Start: 2025-06-03 | End: 2025-09-01

## 2025-06-03 RX ORDER — ALBUTEROL SULFATE 90 UG/1
2 INHALANT RESPIRATORY (INHALATION) EVERY 6 HOURS PRN
Qty: 13.4 G | Refills: 1 | Status: SHIPPED | OUTPATIENT
Start: 2025-06-03

## 2025-06-03 NOTE — PROGRESS NOTES
Assessment & Plan     Onychomycosis  Kevin is a 60-year-old male with history of mild intermittent asthma well-controlled, chronic left knee pain secondary to meniscal injury, hyperlipidemia and asthma presenting for follow-up of lab work.    Has had issues with onychomycosis on the 4th and 5th digits of right foot and first digit of left foot, toenail culture showed presence of Alternaria species which although uncommon is known to cause onychomycosis.  Recommend that we attempt terbinafine treatment, he was agreeable, we discussed the risks and benefits of treatment he understands that there may be liver injury associated with this treatment.  We will obtain a CMP now, in 6 weeks and at end of treatment.    Will monitor for improvement in nail appearance.    - Comprehensive metabolic panel (BMP + Alb, Alk Phos, ALT, AST, Total. Bili, TP)  - Comprehensive metabolic panel (BMP + Alb, Alk Phos, ALT, AST, Total. Bili, TP)  - Comprehensive metabolic panel (BMP + Alb, Alk Phos, ALT, AST, Total. Bili, TP)  - terbinafine (LAMISIL) 250 MG tablet  Dispense: 90 tablet; Refill: 0    Injury of meniscus of left knee, subsequent encounter    Continues to have intermittent in general brief pain in the medial and posterior knee.  Reports he does do well if he is doing the exercise program if he is exercising regularly.  We discussed the MRI results, does have meniscal injury to both lateral and medial menisci, appears to be degenerative.  He will still see the orthopedist which I referred him, I explained that surgery is not likely to be a good option at this time as he is able to manage his pain and he is still fully functional.  He previously declined physical therapy.  Recommend that he continue with home exercises and regular activity.    Mixed hyperlipidemia  Agatston CAC score, <100  Has a history of hyperlipidemia, 10-year ASCVD risk of 9%, CAC scoring done shows calcium score of 10 putting him at low risk, less than 1  %/year for ASCVD.  We discussed the risks, possible benefits of statin medication will be low.  He elects to not use statin medication at this time, we will continue to follow cholesterol panel annually, consider repeat calcium score in 3 to 5 years.    Mild intermittent asthma, unspecified whether complicated  Well-controlled, needs new albuterol inhaler, order placed.  - albuterol (PROAIR HFA/PROVENTIL HFA/VENTOLIN HFA) 108 (90 Base) MCG/ACT inhaler  Dispense: 13.4 g; Refill: 1      Shingles vaccination was given today, he should have it repeated in 2 to 6 months.  Encouraged him to set up an appointment when he does his last lab work to get his vaccine at the same time.        Follow-up    Follow-up Visit   Expected date: Jul 15, 2025      Follow Up Appointment Details:     Follow-up with whom?: Other Primary Care Services    Follow-Up for what?: Lab Visit    How?: In Person             Follow-up Visit   Expected date:  Sep 03, 2025 (Approximate)      Follow Up Appointment Details:     Follow-up with whom?: Other Primary Care Services    Follow-Up for what?: Lab Visit    How?: In Person                 Subjective   Kevin is a 60 year old, presenting for the following health issues:  Follow Up (Lab results and shingles vaccination.)        6/3/2025     7:21 AM   Additional Questions   Roomed by GADIEL YOUNG     History of Present Illness       Reason for visit:  Check up    He eats 0-1 servings of fruits and vegetables daily.He consumes 0 sweetened beverage(s) daily.He exercises with enough effort to increase his heart rate 30 to 60 minutes per day.  He exercises with enough effort to increase his heart rate 3 or less days per week.   He is taking medications regularly.        Reports that he needs a new inhaler, his albuterol is .  His asthma is well-controlled he does needs a new inhaler.    Otherwise no significant changes still has thickened and discolored nails, they have not changed.  Pain in left knee is  "improved when he does exercise, is no worse than it was when we saw him last.  Feeling otherwise well.  Would like to get his shingles shot today.            Review of Systems  Constitutional, neuro, ENT, endocrine, pulmonary, cardiac, gastrointestinal, genitourinary, musculoskeletal, integument and psychiatric systems are negative, except as otherwise noted.      Objective    /88 (BP Location: Right arm, Patient Position: Sitting, Cuff Size: Adult Regular)   Pulse 78   Temp 97.2  F (36.2  C)   Resp 12   Ht 1.768 m (5' 9.61\")   Wt 83 kg (182 lb 14.4 oz)   SpO2 97%   BMI 26.54 kg/m    Body mass index is 26.54 kg/m .  Physical Exam   GENERAL: alert and no distress  MS: no gross musculoskeletal defects noted, no edema  PSYCH: mentation appears normal, affect normal/[facility]    Hospital Outpatient Visit on 05/30/2025   Component Date Value Ref Range Status    Agatston Score of Left Main 05/30/2025 0   Final    Agatston Score of the Left Anterio* 05/30/2025 10   Final    Agatston Score of Circumflex 05/30/2025 0   Final    Agatston Score of Right Coronary A* 05/30/2025 0   Final    Total Score 05/30/2025 10.00   Final     Recent Labs   Lab Test 05/12/25  1610 03/01/24  1632   CHOL 187 180   HDL 47 45   * 116*   TRIG 125 93     Last Comprehensive Metabolic Panel:  Sodium   Date Value Ref Range Status   03/01/2024 140 135 - 145 mmol/L Final     Comment:     Reference intervals for this test were updated on 09/26/2023 to more accurately reflect our healthy population. There may be differences in the flagging of prior results with similar values performed with this method. Interpretation of those prior results can be made in the context of the updated reference intervals.      Potassium   Date Value Ref Range Status   03/01/2024 4.4 3.4 - 5.3 mmol/L Final   06/02/2021 4.2 3.5 - 5.0 mmol/L Final     Chloride   Date Value Ref Range Status   03/01/2024 104 98 - 107 mmol/L Final   06/02/2021 104 98 - 107 mmol/L " Final     Carbon Dioxide (CO2)   Date Value Ref Range Status   03/01/2024 26 22 - 29 mmol/L Final   06/02/2021 24 22 - 31 mmol/L Final     Anion Gap   Date Value Ref Range Status   03/01/2024 10 7 - 15 mmol/L Final   06/02/2021 12 5 - 18 mmol/L Final     Glucose   Date Value Ref Range Status   03/01/2024 89 70 - 99 mg/dL Final   06/02/2021 108 70 - 125 mg/dL Final     Urea Nitrogen   Date Value Ref Range Status   03/01/2024 16.6 8.0 - 23.0 mg/dL Final   06/02/2021 22 8 - 22 mg/dL Final     Creatinine   Date Value Ref Range Status   03/01/2024 1.16 0.67 - 1.17 mg/dL Final     GFR Estimate   Date Value Ref Range Status   03/01/2024 72 >60 mL/min/1.73m2 Final   06/02/2021 >60 >60 mL/min/1.73m2 Final     Calcium   Date Value Ref Range Status   03/01/2024 9.0 8.8 - 10.2 mg/dL Final     Bilirubin Total   Date Value Ref Range Status   03/01/2024 0.5 <=1.2 mg/dL Final     Alkaline Phosphatase   Date Value Ref Range Status   03/01/2024 98 40 - 150 U/L Final     Comment:     Reference intervals for this test were updated on 11/14/2023 to more accurately reflect our healthy population. There may be differences in the flagging of prior results with similar values performed with this method. Interpretation of those prior results can be made in the context of the updated reference intervals.     ALT   Date Value Ref Range Status   03/01/2024 27 0 - 70 U/L Final     Comment:     Reference intervals for this test were updated on 6/12/2023 to more accurately reflect our healthy population. There may be differences in the flagging of prior results with similar values performed with this method. Interpretation of those prior results can be made in the context of the updated reference intervals.       AST   Date Value Ref Range Status   03/01/2024 24 0 - 45 U/L Final     Comment:     Reference intervals for this test were updated on 6/12/2023 to more accurately reflect our healthy population. There may be differences in the flagging of  prior results with similar values performed with this method. Interpretation of those prior results can be made in the context of the updated reference intervals.             Study Result    Narrative & Impression   EXAM: MR KNEE LEFT W/O CONTRAST  LOCATION: [facility]  DATE: 5/22/2025     INDICATION: Patient with chronic left knee pain in the medial and posterior pain, please evaluate.  COMPARISON: Radiographs from 5/12/2025.  TECHNIQUE: Unenhanced.     FINDINGS:     MEDIAL COMPARTMENT:   -Meniscus: There is a free edge tear in the body of the medial meniscus and a horizontal tear in the body and posterior horn. There is a small parameniscal cyst wrapping around the body and posterior horn.  -Cartilage: Normal.     LATERAL COMPARTMENT:  -Meniscus: Small amount of fraying or tearing of the free edge of the body of the lateral meniscus.   -Cartilage: Normal.     PATELLOFEMORAL COMPARTMENT:   -Alignment: Patella midline. No subluxation or tilting.   -Cartilage: Normal.     CRUCIATE LIGAMENTS:   -ACL: Normal.  -PCL: Normal.     COLLATERAL LIGAMENTS:   -Medial collateral ligament: Superficial and deep fibers are normal.  -Lateral collateral ligament: Normal.     POSTEROMEDIAL CORNER:  -Distal semimembranosus tendon is normal.   -Pes anserine tendons are normal. Posteromedial corner complex ligaments are intact.     POSTEROLATERAL CORNER:   -Popliteal tendon is intact. No tendinopathy.  -Biceps femoris tendon and posterolateral corner complex ligaments are intact.     EXTENSOR MECHANISM:   -Quadriceps tendon: Normal.  -Patellar tendon: Normal.  -Patellofemoral ligaments and retinacula: Intact.     JOINT:   -Small effusion. There is no evidence of an intra-articular body.     BONES:  -No fracture or concerning marrow replacing lesion.     SOFT TISSUES:   -No popliteal cyst. No acute muscular injury or soft tissue mass.                                                                        IMPRESSION:  1.  Medial meniscus tear with a small parameniscal cyst.  2.  Small amount of fraying or tearing in the free edge of the body of the lateral meniscus.  3.  Small knee joint effusion.                Signed Electronically by: Mat Boss MD

## 2025-06-09 LAB — BACTERIA SPEC CULT: ABNORMAL

## 2025-08-11 ENCOUNTER — ALLIED HEALTH/NURSE VISIT (OUTPATIENT)
Dept: FAMILY MEDICINE | Facility: CLINIC | Age: 60
End: 2025-08-11
Payer: COMMERCIAL

## 2025-08-11 DIAGNOSIS — Z23 ENCOUNTER FOR IMMUNIZATION: Primary | ICD-10-CM

## 2025-08-11 PROCEDURE — 90750 HZV VACC RECOMBINANT IM: CPT

## 2025-08-11 PROCEDURE — 90471 IMMUNIZATION ADMIN: CPT
